# Patient Record
Sex: FEMALE | Race: WHITE | Employment: OTHER | URBAN - METROPOLITAN AREA
[De-identification: names, ages, dates, MRNs, and addresses within clinical notes are randomized per-mention and may not be internally consistent; named-entity substitution may affect disease eponyms.]

---

## 2022-07-15 ENCOUNTER — TELEPHONE (OUTPATIENT)
Dept: FAMILY MEDICINE CLINIC | Age: 87
End: 2022-07-15

## 2022-07-15 NOTE — TELEPHONE ENCOUNTER
----- Message from Gardenia Joyce sent at 7/15/2022  2:15 PM EDT -----  Subject: Appointment Request    Reason for Call: New Patient/New to Provider Appointment needed: New   Patient Request Appointment    QUESTIONS    Reason for appointment request? No appointments available during search     Additional Information for Provider? Pt daughter would like to establish   mother at this office. Pt daughter is a pt of the office. Is open to   seeing any provider in office.  Please contact as soon as possible.   ---------------------------------------------------------------------------  --------------  Karine Irby [facility]  [phone]; OK to leave message on voicemail  ---------------------------------------------------------------------------  --------------  SCRIPT ANSWERS  COVID Screen: Cinthia Romo

## 2022-08-10 ENCOUNTER — OFFICE VISIT (OUTPATIENT)
Dept: FAMILY MEDICINE CLINIC | Age: 87
End: 2022-08-10
Payer: MEDICARE

## 2022-08-10 VITALS
HEIGHT: 63 IN | DIASTOLIC BLOOD PRESSURE: 60 MMHG | TEMPERATURE: 97.6 F | HEART RATE: 62 BPM | SYSTOLIC BLOOD PRESSURE: 138 MMHG | OXYGEN SATURATION: 99 % | RESPIRATION RATE: 18 BRPM | BODY MASS INDEX: 30.51 KG/M2 | WEIGHT: 172.2 LBS

## 2022-08-10 DIAGNOSIS — B37.2 CANDIDAL INTERTRIGO: ICD-10-CM

## 2022-08-10 DIAGNOSIS — I48.0 PAROXYSMAL ATRIAL FIBRILLATION (HCC): ICD-10-CM

## 2022-08-10 DIAGNOSIS — E11.22 TYPE 2 DIABETES MELLITUS WITH STAGE 3B CHRONIC KIDNEY DISEASE, WITH LONG-TERM CURRENT USE OF INSULIN (HCC): ICD-10-CM

## 2022-08-10 DIAGNOSIS — Z79.4 TYPE 2 DIABETES MELLITUS WITH STAGE 3B CHRONIC KIDNEY DISEASE, WITH LONG-TERM CURRENT USE OF INSULIN (HCC): ICD-10-CM

## 2022-08-10 DIAGNOSIS — Z00.00 MEDICARE ANNUAL WELLNESS VISIT, SUBSEQUENT: Primary | ICD-10-CM

## 2022-08-10 DIAGNOSIS — E78.2 MIXED HYPERLIPIDEMIA: ICD-10-CM

## 2022-08-10 DIAGNOSIS — N18.32 TYPE 2 DIABETES MELLITUS WITH STAGE 3B CHRONIC KIDNEY DISEASE, WITH LONG-TERM CURRENT USE OF INSULIN (HCC): ICD-10-CM

## 2022-08-10 DIAGNOSIS — I10 PRIMARY HYPERTENSION: ICD-10-CM

## 2022-08-10 PROBLEM — N18.4 CKD (CHRONIC KIDNEY DISEASE), STAGE IV (HCC): Status: ACTIVE | Noted: 2019-09-06

## 2022-08-10 PROBLEM — I87.2 VENOUS STASIS DERMATITIS: Status: ACTIVE | Noted: 2017-08-15

## 2022-08-10 PROCEDURE — G0439 PPPS, SUBSEQ VISIT: HCPCS

## 2022-08-10 PROCEDURE — 99204 OFFICE O/P NEW MOD 45 MIN: CPT

## 2022-08-10 RX ORDER — NYSTATIN 100000 [USP'U]/G
POWDER TOPICAL 4 TIMES DAILY
Qty: 60 G | Refills: 5 | Status: SHIPPED | OUTPATIENT
Start: 2022-08-10

## 2022-08-10 RX ORDER — SIMVASTATIN 40 MG/1
20 TABLET, FILM COATED ORAL
COMMUNITY
End: 2022-08-10 | Stop reason: SDUPTHER

## 2022-08-10 RX ORDER — DEXTROMETHORPHAN HYDROBROMIDE, GUAIFENESIN 5; 100 MG/5ML; MG/5ML
650 LIQUID ORAL EVERY 8 HOURS
COMMUNITY

## 2022-08-10 RX ORDER — SENNOSIDES 8.6 MG/1
17.2 TABLET ORAL
COMMUNITY
Start: 2021-11-16 | End: 2022-08-10

## 2022-08-10 RX ORDER — FUROSEMIDE 40 MG/1
20 TABLET ORAL 2 TIMES DAILY
COMMUNITY
Start: 2021-11-16

## 2022-08-10 RX ORDER — POLYETHYLENE GLYCOL 3350 17 G/17G
17 POWDER, FOR SOLUTION ORAL
COMMUNITY
Start: 2021-11-16 | End: 2022-08-10

## 2022-08-10 RX ORDER — ASPIRIN 81 MG/1
81 TABLET ORAL DAILY
COMMUNITY

## 2022-08-10 RX ORDER — DILTIAZEM HYDROCHLORIDE 120 MG/1
1 CAPSULE, EXTENDED RELEASE ORAL DAILY
COMMUNITY
Start: 2021-11-17

## 2022-08-10 RX ORDER — SIMVASTATIN 20 MG/1
20 TABLET, FILM COATED ORAL DAILY
COMMUNITY

## 2022-08-10 NOTE — ACP (ADVANCE CARE PLANNING)
Discussed importance of advanced medical directives with patient. Patient is capable of making decisions. Discussed advanced directives 8/10/2022. [place] advance directive form discussed with pt. Pt will consider options and give us written form back for inclusion in chart.     Sam Rodriguez NP

## 2022-08-10 NOTE — PROGRESS NOTES
Chief Complaint   Patient presents with    [facility]     Here to [facility] and to talk about her IBS-D       HPI:    Alia Beatty is a 80 y.o. female who presents to [facility]; she is accompanied today by her daughter. She was living independently in [place] until about 9 months ago when she experienced several episodes of hypoglycemia; she has since moved to [place] to live with her daughter. Ms. Nilson Reyes is interactive and conversant but the conversation is somewhat tangential.    HTN:  Compliant with metoprolol and diltiazem; does not check BP at home. Denies CP, palpitations, SOB, BURT. PAF:  Details of this are unclear; was noted to be in SR during an ER visit in November. Remains compliant with diltiazem and metoprolol; does not follow with cardiology. Previously on warfarin but this was stopped to due to rectal bleeding and hx multiple falls. HLD:  Compliant with simvastatin. DM:  Diagnosed many years ago; compliant with Humulin 70/30 at once daily dosing. Daughter reports that dose was recently reduced due to hypoglycemia. She does not check sugars at home. Records from November 2021 show A1C 5.4. CKD:  Daughter and records indicate hx stage IV disease, but labs from November 2021 show creatinine 1.49 with GFR 33. IBS:  Ongoing issue for many years, mostly diarrhea-type with occasional constipation. Has improved over the past several months since moving in with her daughter; daughter avoids triggering foods in her diet including starches, certain fruits and vegetables. Has long history of hemorrhoidal bleeding but this has also improved. Struggles with red, inflamed rash under breasts and abdominal and groin folds. Uses OTC cream with tea tree oil daily and tries to keep area dry.     Allergies   Allergen Reactions    Penicillins Hives    Sulfa (Sulfonamide Antibiotics) Hives       Current Outpatient Medications   Medication Sig    dilTIAZem ER (TIAZAC) 120 mg capsule Take 1 Capsule by mouth in the morning. furosemide (LASIX) 40 mg tablet Take 20 mg by mouth two (2) times a day. insulin NPH/insulin regular (NOVOLIN 70/30, HUMULIN 70/30) 100 unit/mL (70-30) injection Take 12 Units by SubCUTAneous route in the morning. metoprolol succinate 50 mg CSpX Take 50 mg by mouth in the morning. simvastatin (ZOCOR) 20 mg tablet Take 20 mg by mouth in the morning. acetaminophen (Tylenol Arthritis Pain) 650 mg TbER Take 650 mg by mouth every eight (8) hours. aspirin delayed-release 81 mg tablet Take 81 mg by mouth in the morning. nystatin (MYCOSTATIN) powder Apply  to affected area four (4) times daily. No current facility-administered medications for this visit. History reviewed. No pertinent past medical history. History reviewed. No pertinent family history. Review of Systems   Constitutional: Negative. Negative for chills, fever and malaise/fatigue. HENT: Negative. Eyes: Negative. Respiratory: Negative. Negative for cough and shortness of breath. Cardiovascular: Negative. Negative for chest pain, palpitations and leg swelling. Gastrointestinal: Negative. Negative for abdominal pain, nausea and vomiting. Genitourinary: Negative. Musculoskeletal: Negative. Skin: Negative. Neurological: Negative. Negative for dizziness and headaches. Endo/Heme/Allergies: Negative. Psychiatric/Behavioral: Negative. Negative for depression. The patient is not nervous/anxious.        /60 (BP 1 Location: Right upper arm, BP Patient Position: Sitting, BP Cuff Size: Adult long)   Pulse 62   Temp 97.6 °F (36.4 °C) (Temporal)   Resp 18   Ht 5' 3\" (1.6 m)   Wt 172 lb 3.2 oz (78.1 kg)   SpO2 99%   BMI 30.50 kg/m²     Wt Readings from Last 3 Encounters:   08/10/22 172 lb 3.2 oz (78.1 kg)       3 most recent PHQ Screens 8/10/2022   Little interest or pleasure in doing things Not at all   Feeling down, depressed, irritable, or hopeless Not at all   Total Score PHQ 2 0   Trouble falling or staying asleep, or sleeping too much Not at all   Feeling tired or having little energy Not at all   Poor appetite, weight loss, or overeating Not at all   Feeling bad about yourself - or that you are a failure or have let yourself or your family down Not at all   Trouble concentrating on things such as school, work, reading, or watching TV Not at all   Moving or speaking so slowly that other people could have noticed; or the opposite being so fidgety that others notice Not at all   Thoughts of being better off dead, or hurting yourself in some way Not at all   PHQ 9 Score 0         Physical Exam  Vitals and nursing note reviewed. Constitutional:       General: She is not in acute distress. Appearance: Normal appearance. She is obese. HENT:      Head: Normocephalic and atraumatic. Mouth/Throat:      Mouth: Mucous membranes are moist.      Pharynx: Oropharynx is clear. Eyes:      Extraocular Movements: Extraocular movements intact. Conjunctiva/sclera: Conjunctivae normal.      Pupils: Pupils are equal, round, and reactive to light. Neck:      Vascular: No carotid bruit. Cardiovascular:      Rate and Rhythm: Normal rate and regular rhythm. Pulses: Normal pulses. Heart sounds: Normal heart sounds. No murmur heard. No friction rub. No gallop. Pulmonary:      Effort: Pulmonary effort is normal.      Breath sounds: Normal breath sounds. No wheezing, rhonchi or rales. Abdominal:      General: Bowel sounds are normal.      Palpations: Abdomen is soft. Musculoskeletal:         General: Normal range of motion. Cervical back: Normal range of motion and neck supple. Comments: Ambulatory with walker   Lymphadenopathy:      Cervical: No cervical adenopathy. Skin:     General: Skin is warm and dry.       Comments: Erythematous moist rash under both breast, at umbilicus, and in abdominal folds  Dark, shiny rash on bilateral anterior lower legs c/w venous stasis dermatitis   Neurological:      General: No focal deficit present. Mental Status: She is alert and oriented to person, place, and time. Mental status is at baseline. Psychiatric:         Attention and Perception: Attention and perception normal.         Mood and Affect: Mood and affect normal.         Speech: Speech is tangential.         Behavior: Behavior normal. Behavior is cooperative. Thought Content: Thought content normal.         Cognition and Memory: Cognition is impaired. Memory is impaired. Judgment: Judgment normal.       ASSESSMENT AND PLAN:       ICD-10-CM ICD-9-CM    1. Medicare annual wellness visit, subsequent  Z00.00 V70.0       2. Type 2 diabetes mellitus with stage 3b chronic kidney disease, with long-term current use of insulin (HCC)  E11.22 250.40     N18.32 585.3     Z79.4 V58.67       3. Mixed hyperlipidemia  E78.2 272.2 CANCELED: COLLECTION VENOUS BLOOD,VENIPUNCTURE      CANCELED: LIPID PANEL      4. Primary hypertension  I10 401.9 CANCELED: COLLECTION VENOUS BLOOD,VENIPUNCTURE      CANCELED: CBC WITH AUTOMATED DIFF      CANCELED: METABOLIC PANEL, COMPREHENSIVE      CANCELED: TSH 3RD GENERATION      5. Paroxysmal atrial fibrillation (HCC)  I48.0 427.31       6. Candidal intertrigo  B37.2 112.3 nystatin (MYCOSTATIN) powder          Available records reviewed, will obtain most recent visit from PCP on month ago. Completed forms for DMV handicapped tag; patient does not drive, but would like to have available for daughter's car. Discussed care of candidal intertrigo; keep area as dry as possible, use Nystatin powder. Recommend daughter contact [address] regarding local senior activities in which her mother can participate and for assistance with respite care.     Medication Side Effects and Warnings were discussed with patient:  yes  Patient Labs were reviewed:  yes  Patient Past Records were reviewed:  yes      Patient aware of plan of care and verbalized understanding. Questions answered. RTC 3 months or sooner if needed. On this date 08/10/2022 I have spent 45 minutes reviewing previous notes, test results and face to face with the patient discussing the diagnosis and importance of compliance with the treatment plan as well as documenting on the day of the visit.     Chandrakant Palumbo, NP

## 2022-08-10 NOTE — PROGRESS NOTES
Rasheeda Nathan is a 80 y.o. female presenting for/with:    Chief Complaint   Patient presents with    Establish Care     Here to [facility] care and to talk about her IBS-D       Visit Vitals  /60 (BP 1 Location: Right upper arm, BP Patient Position: Sitting, BP Cuff Size: Adult long)   Pulse 62   Temp 97.6 °F (36.4 °C) (Temporal)   Resp 18   Ht 5' 3\" (1.6 m)   Wt 172 lb 3.2 oz (78.1 kg)   SpO2 99%   BMI 30.50 kg/m²     Pain Scale: /10  Pain Location:     1. \"Have you been to the ER, urgent care clinic since your last visit? Hospitalized since your last visit? \" No    2. \"Have you seen or consulted any other health care providers outside of the [address] [place] [address] since your last visit? \" No     3. For patients aged 39-70: Has the patient had a colonoscopy / FIT/ Cologuard? NA - based on age      If the patient is female:    4. For patients aged 41-77: Has the patient had a mammogram within the past 2 years? NA - based on age or sex      11. For patients aged 21-65: Has the patient had a pap smear?  NA - based on age or sex      Symptom review:  NO  Fever   NO  Shaking chills  NO  Cough  NO  Body aches  NO  Coughing up blood  NO  Chest congestion  NO  Chest pain  NO  Shortness of breath  NO  Profound Loss of smell/taste  NO  Nausea/Vomiting   NO  Loose stool/Diarrhea  NO  any skin issues    Patient Risk Factors Reviewed as follows:  NO  have you been in Close contact with confirmed COVID19 patient   NO  History of recent travel to affected geographical areas within the past 14 days  NO  COPD  NO  Active Cancer/Leukemia/Lymphoma/Chemotherapy  NO  Oral steroid use  NO  Pregnant  YES  Diabetes Mellitus  YES  Heart disease  NO  Asthma  NO Health care worker at home  NO Health care worker  NO Is there a Pregnant Woman in the home  NO Dialysis pt in the home   NO a large number of people living in the home    Learning Assessment 8/10/2022   PRIMARY LEARNER Patient   PRIMARY LANGUAGE ENGLISH   LEARNER PREFERENCE PRIMARY DEMONSTRATION   ANSWERED BY self   RELATIONSHIP SELF     No flowsheet data found. 3 most recent PHQ Screens 8/10/2022   Little interest or pleasure in doing things Not at all   Feeling down, depressed, irritable, or hopeless Not at all   Total Score PHQ 2 0   Trouble falling or staying asleep, or sleeping too much Not at all   Feeling tired or having little energy Not at all   Poor appetite, weight loss, or overeating Not at all   Feeling bad about yourself - or that you are a failure or have let yourself or your family down Not at all   Trouble concentrating on things such as school, work, reading, or watching TV Not at all   Moving or speaking so slowly that other people could have noticed; or the opposite being so fidgety that others notice Not at all   Thoughts of being better off dead, or hurting yourself in some way Not at all   PHQ 9 Score 0     No flowsheet data found. No flowsheet data found. No flowsheet data found. This is the Subsequent Medicare Annual Wellness Exam, performed 12 months or more after the Initial AWV or the last Subsequent AWV    I have reviewed the patient's medical history in detail and updated the computerized patient record. Assessment/Plan   Education and counseling provided:  Are appropriate based on today's review and evaluation    1. Type 2 diabetes mellitus without complication, with long-term current use of insulin (HCC)  -     COLLECTION VENOUS BLOOD,VENIPUNCTURE  -     HEMOGLOBIN A1C WITH EAG; Future  2. Mixed hyperlipidemia  -     COLLECTION VENOUS BLOOD,VENIPUNCTURE  -     LIPID PANEL; Future  3. Primary hypertension  -     COLLECTION VENOUS BLOOD,VENIPUNCTURE  -     CBC WITH AUTOMATED DIFF; Future  -     METABOLIC PANEL, COMPREHENSIVE; Future  -     TSH 3RD GENERATION; Future  4. Pulmonary HTN (HCC)  -     COLLECTION VENOUS BLOOD,VENIPUNCTURE  -     CBC WITH AUTOMATED DIFF; Future  -     METABOLIC PANEL, COMPREHENSIVE; Future  5.  Medicare annual wellness visit, subsequent       Depression Risk Factor Screening     3 most recent PHQ Screens 8/10/2022   Little interest or pleasure in doing things Not at all   Feeling down, depressed, irritable, or hopeless Not at all   Total Score PHQ 2 0   Trouble falling or staying asleep, or sleeping too much Not at all   Feeling tired or having little energy Not at all   Poor appetite, weight loss, or overeating Not at all   Feeling bad about yourself - or that you are a failure or have let yourself or your family down Not at all   Trouble concentrating on things such as school, work, reading, or watching TV Not at all   Moving or speaking so slowly that other people could have noticed; or the opposite being so fidgety that others notice Not at all   Thoughts of being better off dead, or hurting yourself in some way Not at all   PHQ 9 Score 0       Alcohol & Drug Abuse Risk Screen    Do you average more than 1 drink per night or more than 7 drinks a week:  No    On any one occasion in the past three months have you have had more than 3 drinks containing alcohol:  No          Functional Ability and Level of Safety    Hearing: Hearing is good. Activities of Daily Living: The home contains: no safety equipment. Patient does total self care      Ambulation: with no difficulty     Fall Risk:  No flowsheet data found.    Abuse Screen:  Patient is not abused       Cognitive Screening    Has your family/caregiver stated any concerns about your memory: no     Cognitive Screening: Normal - Verbal Fluency Test    Health Maintenance Due     Health Maintenance Due   Topic Date Due    Lipid Screen  Never done    DTaP/Tdap/Td series (1 - Tdap) Never done    Shingrix Vaccine Age 50> (1 of 2) Never done    Bone Densitometry (Dexa) Screening  Never done    Pneumococcal 65+ years (2 - PPSV23 or PCV20) 07/27/2016    COVID-19 Vaccine (3 - Booster for Sinclair Peter series) 10/13/2021       Patient Care Team   Patient Care Team:  Aquilino López NP as PCP - General (Nurse Practitioner)    History   There is no problem list on file for this patient. History reviewed. No pertinent past medical history. History reviewed. No pertinent surgical history. Current Outpatient Medications   Medication Sig Dispense Refill    dilTIAZem ER (TIAZAC) 120 mg capsule Take 1 Capsule by mouth in the morning. furosemide (LASIX) 40 mg tablet Take 40 mg by mouth. insulin NPH/insulin regular (NOVOLIN 70/30, HUMULIN 70/30) 100 unit/mL (70-30) injection 8 Units by SubCUTAneous route. polyethylene glycol (MIRALAX) 17 gram/dose powder Take 17 g by mouth. senna (SENOKOT) 8.6 mg tablet Take 17.2 mg by mouth.      metoprolol succinate 50 mg CSpX       simvastatin (ZOCOR) 20 mg tablet        Allergies   Allergen Reactions    Penicillins Hives    Sulfa (Sulfonamide Antibiotics) Hives       History reviewed. No pertinent family history.   Social History     Tobacco Use    Smoking status: Never    Smokeless tobacco: Not on file   Substance Use Topics    Alcohol use: Never         Erroll Marcos

## 2022-11-10 ENCOUNTER — OFFICE VISIT (OUTPATIENT)
Dept: FAMILY MEDICINE CLINIC | Age: 87
End: 2022-11-10
Payer: MEDICARE

## 2022-11-10 VITALS
DIASTOLIC BLOOD PRESSURE: 63 MMHG | SYSTOLIC BLOOD PRESSURE: 118 MMHG | RESPIRATION RATE: 18 BRPM | HEIGHT: 63 IN | OXYGEN SATURATION: 99 % | WEIGHT: 174.4 LBS | HEART RATE: 63 BPM | BODY MASS INDEX: 30.9 KG/M2

## 2022-11-10 DIAGNOSIS — J30.2 SEASONAL ALLERGIC RHINITIS, UNSPECIFIED TRIGGER: ICD-10-CM

## 2022-11-10 DIAGNOSIS — Z23 ENCOUNTER FOR IMMUNIZATION: ICD-10-CM

## 2022-11-10 DIAGNOSIS — I87.2 VENOUS STASIS DERMATITIS OF BOTH LOWER EXTREMITIES: ICD-10-CM

## 2022-11-10 DIAGNOSIS — I10 PRIMARY HYPERTENSION: Primary | ICD-10-CM

## 2022-11-10 DIAGNOSIS — Z79.4 TYPE 2 DIABETES MELLITUS WITH STAGE 3B CHRONIC KIDNEY DISEASE, WITH LONG-TERM CURRENT USE OF INSULIN (HCC): ICD-10-CM

## 2022-11-10 DIAGNOSIS — N18.32 TYPE 2 DIABETES MELLITUS WITH STAGE 3B CHRONIC KIDNEY DISEASE, WITH LONG-TERM CURRENT USE OF INSULIN (HCC): ICD-10-CM

## 2022-11-10 DIAGNOSIS — B37.2 CANDIDAL INTERTRIGO: ICD-10-CM

## 2022-11-10 DIAGNOSIS — E78.2 MIXED HYPERLIPIDEMIA: ICD-10-CM

## 2022-11-10 DIAGNOSIS — E11.22 TYPE 2 DIABETES MELLITUS WITH STAGE 3B CHRONIC KIDNEY DISEASE, WITH LONG-TERM CURRENT USE OF INSULIN (HCC): ICD-10-CM

## 2022-11-10 DIAGNOSIS — I48.0 PAROXYSMAL ATRIAL FIBRILLATION (HCC): ICD-10-CM

## 2022-11-10 PROBLEM — N18.4 CKD (CHRONIC KIDNEY DISEASE), STAGE IV (HCC): Status: RESOLVED | Noted: 2019-09-06 | Resolved: 2022-11-10

## 2022-11-10 LAB — HBA1C MFR BLD HPLC: 6.8 %

## 2022-11-10 PROCEDURE — 1123F ACP DISCUSS/DSCN MKR DOCD: CPT

## 2022-11-10 PROCEDURE — 99214 OFFICE O/P EST MOD 30 MIN: CPT

## 2022-11-10 PROCEDURE — 90694 VACC AIIV4 NO PRSRV 0.5ML IM: CPT

## 2022-11-10 PROCEDURE — G0008 ADMIN INFLUENZA VIRUS VAC: HCPCS

## 2022-11-10 PROCEDURE — 90677 PCV20 VACCINE IM: CPT

## 2022-11-10 PROCEDURE — 3044F HG A1C LEVEL LT 7.0%: CPT

## 2022-11-10 PROCEDURE — 36415 COLL VENOUS BLD VENIPUNCTURE: CPT

## 2022-11-10 PROCEDURE — G0009 ADMIN PNEUMOCOCCAL VACCINE: HCPCS

## 2022-11-10 PROCEDURE — 83036 HEMOGLOBIN GLYCOSYLATED A1C: CPT

## 2022-11-10 RX ORDER — CETIRIZINE HYDROCHLORIDE 10 MG/1
10 TABLET ORAL DAILY
Qty: 90 TABLET | Refills: 0 | Status: SHIPPED | OUTPATIENT
Start: 2022-11-10

## 2022-11-10 NOTE — PROGRESS NOTES
Chief Complaint   Patient presents with    Follow-up    Diabetes       HPI:    Brooke Velazquez is a 80 y.o. female who presents for chronic follow-up. HTN:  Compliant with metoprolol and diltiazem; does not check BP at home. Denies CP, palpitations, SOB, BURT. PAF:  Details of this are unclear; was noted to be in SR during an ER visit in November 2021. Remains compliant with diltiazem and metoprolol; does not follow with cardiology. Previously on warfarin but this was stopped to due to rectal bleeding and hx multiple falls. HLD:  Compliant with simvastatin. DM:  Diagnosed many years ago; compliant with Humulin 70/30 at once daily dosing. She does not check sugars at home. No recent episodes of hypoglycemia. CKD:  Daughter and records indicate hx stage IV disease, but labs from November 2021 show creatinine 1.49 with GFR 33. IBS:  Ongoing issue for many years, mostly diarrhea-type with occasional constipation. Has improved over the past several months since moving in with her daughter; daughter avoids triggering foods in her diet including starches, certain fruits and vegetables. Has long history of hemorrhoidal bleeding but this has also improved. Dermatitis:  Red, inflamed rash under breasts and abdominal and groin folds. Uses OTC cream with tea tree oil daily and tries to keep area dry. New issues:  She reports occasional runny nose and watery eyes related to seasonal allergies; she has been using Claritin which has not been very helpful. Allergies   Allergen Reactions    Penicillins Hives    Sulfa (Sulfonamide Antibiotics) Hives       Current Outpatient Medications   Medication Sig    cetirizine (ZYRTEC) 10 mg tablet Take 1 Tablet by mouth daily. dilTIAZem ER (TIAZAC) 120 mg capsule Take 1 Capsule by mouth in the morning. furosemide (LASIX) 40 mg tablet Take 20 mg by mouth two (2) times a day.     insulin NPH/insulin regular (NOVOLIN 70/30, HUMULIN 70/30) 100 unit/mL (70-30) injection Take 12 Units by SubCUTAneous route in the morning. metoprolol succinate 50 mg CSpX Take 50 mg by mouth in the morning. simvastatin (ZOCOR) 20 mg tablet Take 20 mg by mouth in the morning. acetaminophen (TYLENOL) 650 mg TbER Take 650 mg by mouth every eight (8) hours. aspirin delayed-release 81 mg tablet Take 81 mg by mouth in the morning. nystatin (MYCOSTATIN) powder Apply  to affected area four (4) times daily. No current facility-administered medications for this visit. History reviewed. No pertinent past medical history. History reviewed. No pertinent family history. Review of Systems   Constitutional: Negative. Negative for chills, fever and malaise/fatigue. HENT: Negative. Eyes: Negative. Respiratory: Negative. Negative for cough and shortness of breath. Cardiovascular: Negative. Negative for chest pain, palpitations and leg swelling. Gastrointestinal:  Positive for constipation and diarrhea. Negative for abdominal pain, nausea and vomiting. Genitourinary: Negative. Musculoskeletal: Negative. Skin:  Positive for rash. Neurological: Negative. Negative for dizziness and headaches. Endo/Heme/Allergies: Negative. Psychiatric/Behavioral: Negative. Negative for depression. The patient is not nervous/anxious.        /63 (BP 1 Location: Left upper arm, BP Patient Position: Sitting, BP Cuff Size: Adult long)   Pulse 63   Resp 18   Ht 5' 3\" (1.6 m)   Wt 174 lb 6.4 oz (79.1 kg)   SpO2 99%   BMI 30.89 kg/m²     Wt Readings from Last 3 Encounters:   11/10/22 174 lb 6.4 oz (79.1 kg)   08/10/22 172 lb 3.2 oz (78.1 kg)       3 most recent PHQ Screens 11/10/2022   Little interest or pleasure in doing things Not at all   Feeling down, depressed, irritable, or hopeless Not at all   Total Score PHQ 2 0   Trouble falling or staying asleep, or sleeping too much -   Feeling tired or having little energy -   Poor appetite, weight loss, or overeating -   Feeling bad about yourself - or that you are a failure or have let yourself or your family down -   Trouble concentrating on things such as school, work, reading, or watching TV -   Moving or speaking so slowly that other people could have noticed; or the opposite being so fidgety that others notice -   Thoughts of being better off dead, or hurting yourself in some way -   PHQ 9 Score -         Physical Exam  Vitals and nursing note reviewed. Constitutional:       General: She is not in acute distress. Appearance: Normal appearance. She is overweight. HENT:      Head: Normocephalic and atraumatic. Mouth/Throat:      Mouth: Mucous membranes are moist.      Pharynx: Oropharynx is clear. Eyes:      Extraocular Movements: Extraocular movements intact. Conjunctiva/sclera: Conjunctivae normal.      Pupils: Pupils are equal, round, and reactive to light. Neck:      Vascular: No carotid bruit. Cardiovascular:      Rate and Rhythm: Normal rate and regular rhythm. Pulses: Normal pulses. Heart sounds: Normal heart sounds. No murmur heard. No friction rub. No gallop. Pulmonary:      Effort: Pulmonary effort is normal.      Breath sounds: Normal breath sounds. No wheezing, rhonchi or rales. Abdominal:      General: Bowel sounds are normal.      Palpations: Abdomen is soft. Musculoskeletal:         General: Normal range of motion. Cervical back: Normal range of motion and neck supple. Feet:      Right foot:      Protective Sensation: 6 sites tested. 6 sites sensed. Skin integrity: Dry skin present. Toenail Condition: Right toenails are abnormally thick. Fungal disease present. Left foot:      Protective Sensation: 6 sites tested. 6 sites sensed. Skin integrity: Dry skin present. Toenail Condition: Left toenails are abnormally thick. Fungal disease present. Lymphadenopathy:      Cervical: No cervical adenopathy.    Skin:     General: Skin is warm and dry. Comments: Shiny, erythematous rash in abdominal and groin folds   Thick, woody dermatitis to pretibial aspect of BLE   Neurological:      General: No focal deficit present. Mental Status: She is alert and oriented to person, place, and time. Mental status is at baseline. Psychiatric:         Mood and Affect: Mood normal.         Behavior: Behavior normal.         Thought Content: Thought content normal.         Judgment: Judgment normal.       ASSESSMENT AND PLAN:       ICD-10-CM ICD-9-CM    1. Primary hypertension  I10 401.9 COLLECTION VENOUS BLOOD,VENIPUNCTURE      CBC WITH AUTOMATED DIFF      METABOLIC PANEL, COMPREHENSIVE      TSH 3RD GENERATION      TSH 3RD GENERATION      METABOLIC PANEL, COMPREHENSIVE      CBC WITH AUTOMATED DIFF      2. Type 2 diabetes mellitus with stage 3b chronic kidney disease, with long-term current use of insulin (HCC)  E11.22 250.40 MICROALBUMIN, UR, RAND W/ MICROALB/CREAT RATIO    N18.32 585.3 COLLECTION VENOUS BLOOD,VENIPUNCTURE    Z79.4 V58.67 AMB POC HEMOGLOBIN A1C      MICROALBUMIN, UR, RAND W/ MICROALB/CREAT RATIO      HM DIABETES FOOT EXAM      3. Mixed hyperlipidemia  E78.2 272.2 COLLECTION VENOUS BLOOD,VENIPUNCTURE      LIPID PANEL      LIPID PANEL      4. Paroxysmal atrial fibrillation (HCC)  I48.0 427.31       5. Venous stasis dermatitis of both lower extremities  I87.2 454.1       6. Candidal intertrigo  B37.2 112.3       7. Seasonal allergic rhinitis, unspecified trigger  J30.2 477.9 cetirizine (ZYRTEC) 10 mg tablet      8. Encounter for immunization  Z23 V03.89 [place] IMMUNIZ ADMIN,1 SINGLE/COMB VAC/TOXOID      INFLUENZA, FLUAD, (AGE 65 Y+), IM, PF, 0.5 ML      PNEUMOCOCCAL, PCV20, PREVNAR 20, (AGE 18 YRS+), IM, PF          A1C 6.8; continue current regimen. Normotensive today; continue current regimen. Switch daily antihistamine from loratadine to cetirizine.   Health Maintenance reviewed - urine microalbumin ordered, Pneumovax and flu vaccines given, patient asked to schedule diabetic eye exam.  Continue OTC tea tree topical treatment of candidiasis; keep area as dry as possible. Continue to use Imodium PRN for IBS-related diarrhea; recommend adding fiber supplement to her diet. Medication Side Effects and Warnings were discussed with patient:  yes  Patient Labs were reviewed:  yes  Patient Past Records were reviewed:  yes      Patient aware of plan of care and verbalized understanding. Questions answered. RTC 3 months or sooner if needed. On this date 11/10/2022 I have spent 30 minutes reviewing previous notes, test results and face to face with the patient discussing the diagnosis and importance of compliance with the treatment plan as well as documenting on the day of the visit.     Sundar Jorge NP

## 2022-11-10 NOTE — PATIENT INSTRUCTIONS
[facility] [phone]  [facility] [phone]  [place] [id number] [phone]    Try Benefiber or Metamucil

## 2022-11-10 NOTE — PROGRESS NOTES
flu administered in Right deltoid. Patient tolerated medication well. AVS provided to patient with medication information. Patient states understanding. pcv20 administered in left deltoid. Patient tolerated medication well. AVS provided to patient with medication information. Patient states understanding. 1. \"Have you been to the ER, urgent care clinic since your last visit? Hospitalized since your last visit? \" No    2. \"Have you seen or consulted any other health care providers outside of the [address] [place] [address] since your last visit? \" No     3. For patients aged 39-70: Has the patient had a colonoscopy / FIT/ Cologuard? NA - based on age      If the patient is female:    4. For patients aged 41-77: Has the patient had a mammogram within the past 2 years? NA - based on age or sex      11. For patients aged 21-65: Has the patient had a pap smear? NA - based on age or sex    Chief Complaint   Patient presents with    Follow-up    Diabetes     Visit Vitals  /63 (BP 1 Location: Left upper arm, BP Patient Position: Sitting, BP Cuff Size: Adult long)   Pulse 63   Resp 18   Ht 5' 3\" (1.6 m)   Wt 174 lb 6.4 oz (79.1 kg)   SpO2 99%   BMI 30.89 kg/m²     Results for orders placed or performed in visit on 11/10/22   1.  AMB POC HEMOGLOBIN A1C   Result Value Ref Range    Hemoglobin A1c (POC) 6.8 %

## 2022-11-11 LAB
ALBUMIN SERPL-MCNC: 3.7 G/DL (ref 3.5–5)
ALBUMIN/GLOB SERPL: 1.1 {RATIO} (ref 1.1–2.2)
ALP SERPL-CCNC: 138 U/L (ref 45–117)
ALT SERPL-CCNC: 13 U/L (ref 12–78)
ANION GAP SERPL CALC-SCNC: 6 MMOL/L (ref 5–15)
AST SERPL-CCNC: 11 U/L (ref 15–37)
BASOPHILS # BLD: 0.1 K/UL (ref 0–0.1)
BASOPHILS NFR BLD: 1 % (ref 0–1)
BILIRUB SERPL-MCNC: 0.5 MG/DL (ref 0.2–1)
BUN SERPL-MCNC: 47 MG/DL (ref 6–20)
BUN/CREAT SERPL: 24 (ref 12–20)
CALCIUM SERPL-MCNC: 8.8 MG/DL (ref 8.5–10.1)
CHLORIDE SERPL-SCNC: 107 MMOL/L (ref 97–108)
CHOLEST SERPL-MCNC: 92 MG/DL
CO2 SERPL-SCNC: 28 MMOL/L (ref 21–32)
CREAT SERPL-MCNC: 1.93 MG/DL (ref 0.55–1.02)
CREAT UR-MCNC: 32.9 MG/DL
DIFFERENTIAL METHOD BLD: ABNORMAL
EOSINOPHIL # BLD: 0.1 K/UL (ref 0–0.4)
EOSINOPHIL NFR BLD: 2 % (ref 0–7)
ERYTHROCYTE [DISTWIDTH] IN BLOOD BY AUTOMATED COUNT: 13.5 % (ref 11.5–14.5)
GLOBULIN SER CALC-MCNC: 3.4 G/DL (ref 2–4)
GLUCOSE SERPL-MCNC: 174 MG/DL (ref 65–100)
HCT VFR BLD AUTO: 42.5 % (ref 35–47)
HDLC SERPL-MCNC: 52 MG/DL
HDLC SERPL: 1.8 {RATIO} (ref 0–5)
HGB BLD-MCNC: 12.7 G/DL (ref 11.5–16)
IMM GRANULOCYTES # BLD AUTO: 0 K/UL (ref 0–0.04)
IMM GRANULOCYTES NFR BLD AUTO: 0 % (ref 0–0.5)
LDLC SERPL CALC-MCNC: 24.2 MG/DL (ref 0–100)
LYMPHOCYTES # BLD: 1.1 K/UL (ref 0.8–3.5)
LYMPHOCYTES NFR BLD: 18 % (ref 12–49)
MCH RBC QN AUTO: 29 PG (ref 26–34)
MCHC RBC AUTO-ENTMCNC: 29.9 G/DL (ref 30–36.5)
MCV RBC AUTO: 97 FL (ref 80–99)
MICROALBUMIN UR-MCNC: 6.07 MG/DL
MICROALBUMIN/CREAT UR-RTO: 184 MG/G (ref 0–30)
MONOCYTES # BLD: 0.5 K/UL (ref 0–1)
MONOCYTES NFR BLD: 9 % (ref 5–13)
NEUTS SEG # BLD: 4.2 K/UL (ref 1.8–8)
NEUTS SEG NFR BLD: 70 % (ref 32–75)
NRBC # BLD: 0 K/UL (ref 0–0.01)
NRBC BLD-RTO: 0 PER 100 WBC
PLATELET # BLD AUTO: 167 K/UL (ref 150–400)
POTASSIUM SERPL-SCNC: 5.1 MMOL/L (ref 3.5–5.1)
PROT SERPL-MCNC: 7.1 G/DL (ref 6.4–8.2)
RBC # BLD AUTO: 4.38 M/UL (ref 3.8–5.2)
SODIUM SERPL-SCNC: 141 MMOL/L (ref 136–145)
TRIGL SERPL-MCNC: 79 MG/DL (ref ?–150)
TSH SERPL DL<=0.05 MIU/L-ACNC: 3.47 UIU/ML (ref 0.36–3.74)
VLDLC SERPL CALC-MCNC: 15.8 MG/DL
WBC # BLD AUTO: 6 K/UL (ref 3.6–11)

## 2022-12-07 ENCOUNTER — PATIENT MESSAGE (OUTPATIENT)
Dept: FAMILY MEDICINE CLINIC | Age: 87
End: 2022-12-07

## 2022-12-08 RX ORDER — DILTIAZEM HYDROCHLORIDE 120 MG/1
120 CAPSULE, EXTENDED RELEASE ORAL DAILY
Qty: 90 CAPSULE | Refills: 1 | Status: SHIPPED | OUTPATIENT
Start: 2022-12-08

## 2022-12-08 NOTE — TELEPHONE ENCOUNTER
From: Abby Dumont  To: Hector Grimaldo NP  Sent: 12/7/2022 4:17 PM EST  Subject: diltiazem hydrochloride extended release capsules 128mg    ruth valadez is out of refills for some of her meds. the diltiazem she only has 12 days left on this meds. the pharmacy has sent notice of meds that are out of refills. thanks,  paolo diane- daughter and caregiver for ruth.

## 2023-01-02 ENCOUNTER — PATIENT MESSAGE (OUTPATIENT)
Dept: FAMILY MEDICINE CLINIC | Age: 88
End: 2023-01-02

## 2023-01-04 ENCOUNTER — TELEPHONE (OUTPATIENT)
Dept: FAMILY MEDICINE CLINIC | Age: 88
End: 2023-01-04

## 2023-01-04 DIAGNOSIS — I10 PRIMARY HYPERTENSION: ICD-10-CM

## 2023-01-04 DIAGNOSIS — I48.0 PAROXYSMAL ATRIAL FIBRILLATION (HCC): Primary | ICD-10-CM

## 2023-01-04 RX ORDER — METOPROLOL SUCCINATE 50 MG/1
50 TABLET, EXTENDED RELEASE ORAL DAILY
Qty: 90 TABLET | Refills: 1 | Status: SHIPPED | OUTPATIENT
Start: 2023-01-04

## 2023-02-05 DIAGNOSIS — J30.2 SEASONAL ALLERGIC RHINITIS, UNSPECIFIED TRIGGER: ICD-10-CM

## 2023-02-06 RX ORDER — CETIRIZINE HYDROCHLORIDE 10 MG/1
10 TABLET ORAL DAILY
Qty: 90 TABLET | Refills: 0 | Status: SHIPPED | OUTPATIENT
Start: 2023-02-06

## 2023-02-28 ENCOUNTER — OFFICE VISIT (OUTPATIENT)
Dept: FAMILY MEDICINE CLINIC | Age: 88
End: 2023-02-28
Payer: MEDICARE

## 2023-02-28 VITALS
RESPIRATION RATE: 18 BRPM | SYSTOLIC BLOOD PRESSURE: 138 MMHG | HEIGHT: 63 IN | WEIGHT: 173 LBS | OXYGEN SATURATION: 100 % | DIASTOLIC BLOOD PRESSURE: 60 MMHG | BODY MASS INDEX: 30.65 KG/M2 | HEART RATE: 65 BPM

## 2023-02-28 DIAGNOSIS — I10 PRIMARY HYPERTENSION: ICD-10-CM

## 2023-02-28 DIAGNOSIS — I48.0 PAROXYSMAL ATRIAL FIBRILLATION (HCC): ICD-10-CM

## 2023-02-28 DIAGNOSIS — N18.32 TYPE 2 DIABETES MELLITUS WITH STAGE 3B CHRONIC KIDNEY DISEASE, WITH LONG-TERM CURRENT USE OF INSULIN (HCC): Primary | ICD-10-CM

## 2023-02-28 DIAGNOSIS — E11.22 TYPE 2 DIABETES MELLITUS WITH STAGE 3B CHRONIC KIDNEY DISEASE, WITH LONG-TERM CURRENT USE OF INSULIN (HCC): Primary | ICD-10-CM

## 2023-02-28 DIAGNOSIS — B37.2 CANDIDAL INTERTRIGO: ICD-10-CM

## 2023-02-28 DIAGNOSIS — Z71.89 ADVANCED CARE PLANNING/COUNSELING DISCUSSION: ICD-10-CM

## 2023-02-28 DIAGNOSIS — I87.2 VENOUS STASIS DERMATITIS OF BOTH LOWER EXTREMITIES: ICD-10-CM

## 2023-02-28 DIAGNOSIS — Z79.4 TYPE 2 DIABETES MELLITUS WITH STAGE 3B CHRONIC KIDNEY DISEASE, WITH LONG-TERM CURRENT USE OF INSULIN (HCC): Primary | ICD-10-CM

## 2023-02-28 DIAGNOSIS — E78.2 MIXED HYPERLIPIDEMIA: ICD-10-CM

## 2023-02-28 LAB — HBA1C MFR BLD HPLC: 6.8 %

## 2023-02-28 PROCEDURE — G8536 NO DOC ELDER MAL SCRN: HCPCS

## 2023-02-28 PROCEDURE — 83036 HEMOGLOBIN GLYCOSYLATED A1C: CPT

## 2023-02-28 PROCEDURE — G8427 DOCREV CUR MEDS BY ELIG CLIN: HCPCS

## 2023-02-28 PROCEDURE — 1090F PRES/ABSN URINE INCON ASSESS: CPT

## 2023-02-28 PROCEDURE — G8432 DEP SCR NOT DOC, RNG: HCPCS

## 2023-02-28 PROCEDURE — 1101F PT FALLS ASSESS-DOCD LE1/YR: CPT

## 2023-02-28 PROCEDURE — G8417 CALC BMI ABV UP PARAM F/U: HCPCS

## 2023-02-28 PROCEDURE — 1123F ACP DISCUSS/DSCN MKR DOCD: CPT

## 2023-02-28 PROCEDURE — 99214 OFFICE O/P EST MOD 30 MIN: CPT

## 2023-02-28 PROCEDURE — 3044F HG A1C LEVEL LT 7.0%: CPT

## 2023-02-28 NOTE — PROGRESS NOTES
YES Answers must have Comments  1. \"Have you been to the ER, urgent care clinic since your last visit? Hospitalized since your last visit? \"    [] YES   [x] NO       2. Have you seen or consulted any other health care providers outside of [address] [place] [address] since your last visit?     [] YES   [x] NO       3. For patients aged 39-70: Have you had a colorectal cancer screening such as a colonoscopy/FIT/Cologuard? Nurse/CMA to request records if not in chart   [] YES   [] NO   [x] NA, based on age    If the patient is female:      4. For female patients aged 41-77: Breanna Sessions you had a mammogram in the last two years?  Nurse/CMA to request records if not in chart   [] YES   [] NO   [x] NA, based on age    11. For female patients aged 21-65: Breanna Sessions you had a pap smear?   Nurse/CMA to request records if not in chart   [] YES   [] NO  [x] NA, based on age    Chief Complaint   Patient presents with    Follow-up     Visit Vitals  /60 (BP 1 Location: Left upper arm, BP Patient Position: Sitting)   Pulse 65   Resp 18   Ht 5' 3\" (1.6 m)   Wt 173 lb (78.5 kg)   SpO2 100%   BMI 30.65 kg/m²       Results for orders placed or performed in visit on 02/28/23   AMB POC HEMOGLOBIN A1C   Result Value Ref Range    Hemoglobin A1c (POC) 6.8 %

## 2023-02-28 NOTE — PROGRESS NOTES
Chief Complaint   Patient presents with    Follow-up       HPI:    Tez Sarmiento is a 80 y.o. female who presents for chronic follow-up; she is accompanied by her daughter Janice Mukherjee who helps provide HPI. Her daughter passed away recently; granddaughter being secretive, wouldn't let anyone visit before her death. HTN:  Compliant with metoprolol and diltiazem; does not check BP at home. Denies CP, palpitations, SOB, BURT. PAF:  Details of this are unclear; was noted to be in SR during an ER visit in November 2021. Remains compliant with diltiazem and metoprolol; does not follow with cardiology. Previously on warfarin but this was stopped to due to rectal bleeding and hx multiple falls. HLD:  Compliant with simvastatin. DM:  Diagnosed many years ago; compliant with Humulin 70/30 at once daily dosing. She does not check sugars at home. No recent episodes of hypoglycemia. CKD: Creatinine declining 1.49-->1.93, with albuminuria. Does not follow with urology. IBS:  Ongoing issue for many years, mostly diarrhea-type with occasional constipation. Has improved over the past several months since moving in with her daughter; daughter avoids triggering foods in her diet including starches, certain fruits and vegetables. Has long history of hemorrhoidal bleeding but this has also improved. Dermatitis:  Red, inflamed rash under breasts and abdominal and groin folds. Uses OTC cream with tea tree oil daily and tries to keep area dry; did not find Nystatin powder effective so she hasn't been using this. 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 Funny nose and watery eyes improved after injections.     Allergies   Allergen Reactions    Penicillins Hives    Sulfa (Sulfonamide Antibiotics) Hives       Current Outpatient Medications   Medication Sig    cetirizine (ZYRTEC) 10 mg tablet Take 1 Tablet by mouth daily. simvastatin (ZOCOR) 20 mg tablet Take 1 Tablet by mouth daily. metoprolol succinate (TOPROL-XL) 50 mg XL tablet Take 1 Tablet by mouth daily. dilTIAZem ER (TIAZAC) 120 mg capsule Take 1 Capsule by mouth daily. furosemide (LASIX) 40 mg tablet Take 20 mg by mouth two (2) times a day. insulin NPH/insulin regular (NOVOLIN 70/30, HUMULIN 70/30) 100 unit/mL (70-30) injection Take 12 Units by SubCUTAneous route in the morning. acetaminophen (TYLENOL) 650 mg TbER Take 650 mg by mouth every eight (8) hours. aspirin delayed-release 81 mg tablet Take 81 mg by mouth in the morning. No current facility-administered medications for this visit. History reviewed. No pertinent past medical history. History reviewed. No pertinent family history. Review of Systems   Constitutional: Negative. Negative for chills, fever and malaise/fatigue. HENT: Negative. Eyes: Negative. Respiratory: Negative. Negative for cough and shortness of breath. Cardiovascular: Negative. Negative for chest pain, palpitations and leg swelling. Gastrointestinal: Negative. Negative for abdominal pain, nausea and vomiting. Genitourinary: Negative. Musculoskeletal: Negative. Skin:  Positive for rash. Neurological: Negative. Negative for dizziness and headaches. Endo/Heme/Allergies: Negative. Psychiatric/Behavioral: Negative. Negative for depression. The patient is not nervous/anxious.        /60 (BP 1 Location: Left upper arm, BP Patient Position: Sitting)   Pulse 65   Resp 18   Ht 5' 3\" (1.6 m)   Wt 173 lb (78.5 kg)   SpO2 100%   BMI 30.65 kg/m²     Wt Readings from Last 3 Encounters:   02/28/23 173 lb (78.5 kg)   11/10/22 174 lb 6.4 oz (79.1 kg)   08/10/22 172 lb 3.2 oz (78.1 kg)       3 most recent PHQ Screens 11/10/2022   Little interest or pleasure in doing things Not at all   Feeling down, depressed, irritable, or hopeless Not at all   Total Score PHQ 2 0   Trouble falling or staying asleep, or sleeping too much -   Feeling tired or having little energy -   Poor appetite, weight loss, or overeating -   Feeling bad about yourself - or that you are a failure or have let yourself or your family down -   Trouble concentrating on things such as school, work, reading, or watching TV -   Moving or speaking so slowly that other people could have noticed; or the opposite being so fidgety that others notice -   Thoughts of being better off dead, or hurting yourself in some way -   PHQ 9 Score -       Physical Exam  Vitals and nursing note reviewed. Constitutional:       General: She is not in acute distress. Appearance: Normal appearance. She is obese. HENT:      Head: Normocephalic and atraumatic. Mouth/Throat:      Mouth: Mucous membranes are moist.      Pharynx: Oropharynx is clear. Eyes:      Extraocular Movements: Extraocular movements intact. Conjunctiva/sclera: Conjunctivae normal.      Pupils: Pupils are equal, round, and reactive to light. Neck:      Vascular: No carotid bruit. Cardiovascular:      Rate and Rhythm: Normal rate and regular rhythm. Pulses: Normal pulses. Heart sounds: Normal heart sounds. No murmur heard. No friction rub. No gallop. Pulmonary:      Effort: Pulmonary effort is normal.      Breath sounds: Normal breath sounds. No wheezing, rhonchi or rales. Abdominal:      General: Bowel sounds are normal.      Palpations: Abdomen is soft. Musculoskeletal:         General: Normal range of motion. Cervical back: Normal range of motion and neck supple. Lymphadenopathy:      Cervical: No cervical adenopathy. Skin:     General: Skin is warm and dry. Neurological:      General: No focal deficit present. Mental Status: She is alert and oriented to person, place, and time. Mental status is at baseline.    Psychiatric:         Mood and Affect: Mood normal.         Behavior: Behavior normal.         Thought Content: Thought content normal.         Judgment: Judgment normal.       ASSESSMENT AND PLAN:       ICD-10-CM ICD-9-CM    1. Type 2 diabetes mellitus with stage 3b chronic kidney disease, with long-term current use of insulin (HCC)  E11.22 250.40 COLLECTION CAPILLARY BLOOD SPECIMEN    N18.32 585.3 AMB POC HEMOGLOBIN A1C    Z79.4 V58.67       2. Paroxysmal atrial fibrillation (HCC)  I48.0 427.31       3. Primary hypertension  I10 401.9       4. Candidal intertrigo  B37.2 112.3       5. Venous stasis dermatitis of both lower extremities  I87.2 454.1       6. Mixed hyperlipidemia  E78.2 272.2           A1C 6.8% today in office; continue current medication regimen without change. Discussed continued care of intertrigo; keep area dry, keep skin folds from touching by using soft cloth or articles of clothing, apply topical treatments as necessary. Medication Side Effects and Warnings were discussed with patient:  yes  Patient Labs were reviewed:  yes  Patient Past Records were reviewed:  yes      Patient aware of plan of care and verbalized understanding. Questions answered. RTC 6 months or sooner if needed. On this date 02/28/2023 I have spent 30 minutes reviewing previous notes, test results and face to face with the patient discussing the diagnosis and importance of compliance with the treatment plan as well as documenting on the day of the visit.     Placido Lazo NP

## 2023-02-28 NOTE — ACP (ADVANCE CARE PLANNING)
Advance Care Planning     Advance Care Planning (ACP) Physician/NP/PA Conversation      Date of Conversation: 2/28/2023  Conducted with: Patient with Decision Making Capacity and Healthcare Decision Maker: Named in Advance Directive or [address]:     Primary Decision Maker: Tayler Walsh - Daughter - [phone]  Click here to [address] including selection of the Healthcare Decision Maker Relationship (ie \"Primary\")      Today we documented Decision Maker(s) consistent with Legal Next of Kin hierarchy. Care Preferences:    Hospitalization: \"If your health worsens and it becomes clear that your chance of recovery is unlikely, what would be your preference regarding hospitalization? \"  The patient would prefer comfort-focused treatment without hospitalization. Ventilation: \"If you were unable to breathe on your own and your chance of recovery was unlikely, what would be your preference about the use of a ventilator (breathing machine) if it was available to you? \"   The patient would NOT desire the use of a ventilator. Resuscitation: \"In the event your heart stopped as a result of an underlying serious health condition, would you want attempts to be made to restart your heart, or would you prefer a natural death? \"   No, do NOT attempt to resuscitate.     Additional topics discussed: treatment goals    Conversation Outcomes / Follow-Up Plan:   ACP complete - no further action today  Reviewed DNR/DNI and patient elects DNR order - completed portable DNR form & placed order     Length of Voluntary ACP Conversation in minutes:  <16 minutes (Non-Billable)    Debra Rivera NP

## 2023-04-17 DIAGNOSIS — I48.0 PAROXYSMAL ATRIAL FIBRILLATION (HCC): ICD-10-CM

## 2023-04-17 DIAGNOSIS — I10 PRIMARY HYPERTENSION: ICD-10-CM

## 2023-04-17 RX ORDER — METOPROLOL SUCCINATE 50 MG/1
50 TABLET, EXTENDED RELEASE ORAL DAILY
Qty: 90 TABLET | Refills: 1 | Status: SHIPPED | OUTPATIENT
Start: 2023-04-17

## 2023-05-10 RX ORDER — CETIRIZINE HYDROCHLORIDE 10 MG/1
10 TABLET ORAL DAILY
Qty: 90 TABLET | Refills: 3 | Status: SHIPPED | OUTPATIENT
Start: 2023-05-10

## 2023-05-16 DIAGNOSIS — E11.22 TYPE 2 DIABETES MELLITUS WITH STAGE 3A CHRONIC KIDNEY DISEASE, WITH LONG-TERM CURRENT USE OF INSULIN (HCC): Primary | ICD-10-CM

## 2023-05-16 DIAGNOSIS — N18.31 TYPE 2 DIABETES MELLITUS WITH STAGE 3A CHRONIC KIDNEY DISEASE, WITH LONG-TERM CURRENT USE OF INSULIN (HCC): Primary | ICD-10-CM

## 2023-05-16 DIAGNOSIS — Z79.4 TYPE 2 DIABETES MELLITUS WITH STAGE 3A CHRONIC KIDNEY DISEASE, WITH LONG-TERM CURRENT USE OF INSULIN (HCC): Primary | ICD-10-CM

## 2023-05-16 NOTE — TELEPHONE ENCOUNTER
Medication Refill Request    Kelly Hernández is requesting a refill of the following medication(s):     insulin 70-30 (HUMULIN;NOVOLIN) (70-30) 100 UNIT per ML injection vial    Please send refill to:     [address], [address]  [address]  [address]  Phone: [phone] Fax: [phone]

## 2023-05-26 RX ORDER — DILTIAZEM HYDROCHLORIDE 120 MG/1
CAPSULE, EXTENDED RELEASE ORAL
Qty: 90 CAPSULE | Refills: 1 | Status: SHIPPED | OUTPATIENT
Start: 2023-05-26

## 2023-06-07 ENCOUNTER — TELEPHONE (OUTPATIENT)
Age: 88
End: 2023-06-07

## 2023-06-07 DIAGNOSIS — Z79.4 TYPE 2 DIABETES MELLITUS WITH STAGE 3B CHRONIC KIDNEY DISEASE, WITH LONG-TERM CURRENT USE OF INSULIN (HCC): Primary | ICD-10-CM

## 2023-06-07 DIAGNOSIS — E11.22 TYPE 2 DIABETES MELLITUS WITH STAGE 3B CHRONIC KIDNEY DISEASE, WITH LONG-TERM CURRENT USE OF INSULIN (HCC): Primary | ICD-10-CM

## 2023-06-07 DIAGNOSIS — N18.32 TYPE 2 DIABETES MELLITUS WITH STAGE 3B CHRONIC KIDNEY DISEASE, WITH LONG-TERM CURRENT USE OF INSULIN (HCC): Primary | ICD-10-CM

## 2023-06-07 RX ORDER — SYRINGE-NEEDLE,INSULIN,0.5 ML 27GX1/2"
1 SYRINGE, EMPTY DISPOSABLE MISCELLANEOUS DAILY
Qty: 100 EACH | Refills: 3 | Status: SHIPPED | OUTPATIENT
Start: 2023-06-07 | End: 2023-06-07 | Stop reason: SDUPTHER

## 2023-06-07 RX ORDER — SYRINGE-NEEDLE,INSULIN,0.5 ML 27GX1/2"
1 SYRINGE, EMPTY DISPOSABLE MISCELLANEOUS 3 TIMES DAILY
Qty: 300 EACH | Refills: 3 | Status: SHIPPED | OUTPATIENT
Start: 2023-06-07

## 2023-06-07 NOTE — TELEPHONE ENCOUNTER
Patient wants refill on medication that was last filled by Dr. Al Dickey. TRUE PLUS INS SYR 0.5ml 30G x 5/16. Use as directed three times a day.

## 2023-07-18 RX ORDER — FUROSEMIDE 20 MG/1
TABLET ORAL
Qty: 180 TABLET | Refills: 1 | Status: SHIPPED | OUTPATIENT
Start: 2023-07-18

## 2023-08-14 RX ORDER — SIMVASTATIN 20 MG
20 TABLET ORAL DAILY
Qty: 90 TABLET | Refills: 1 | Status: SHIPPED | OUTPATIENT
Start: 2023-08-14

## 2023-09-02 DIAGNOSIS — E11.22 TYPE 2 DIABETES MELLITUS WITH STAGE 3A CHRONIC KIDNEY DISEASE, WITH LONG-TERM CURRENT USE OF INSULIN (HCC): ICD-10-CM

## 2023-09-02 DIAGNOSIS — Z79.4 TYPE 2 DIABETES MELLITUS WITH STAGE 3A CHRONIC KIDNEY DISEASE, WITH LONG-TERM CURRENT USE OF INSULIN (HCC): ICD-10-CM

## 2023-09-02 DIAGNOSIS — N18.31 TYPE 2 DIABETES MELLITUS WITH STAGE 3A CHRONIC KIDNEY DISEASE, WITH LONG-TERM CURRENT USE OF INSULIN (HCC): ICD-10-CM

## 2023-09-05 RX ORDER — HUMAN INSULIN 100 [USP'U]/ML
INJECTION, SUSPENSION SUBCUTANEOUS
Qty: 10 ML | Refills: 1 | Status: SHIPPED | OUTPATIENT
Start: 2023-09-05

## 2023-09-29 ENCOUNTER — OFFICE VISIT (OUTPATIENT)
Age: 88
End: 2023-09-29
Payer: MEDICARE

## 2023-09-29 VITALS
BODY MASS INDEX: 31.22 KG/M2 | RESPIRATION RATE: 18 BRPM | DIASTOLIC BLOOD PRESSURE: 64 MMHG | HEART RATE: 98 BPM | WEIGHT: 176.2 LBS | HEIGHT: 63 IN | SYSTOLIC BLOOD PRESSURE: 118 MMHG | OXYGEN SATURATION: 100 %

## 2023-09-29 DIAGNOSIS — I48.0 PAROXYSMAL ATRIAL FIBRILLATION (HCC): ICD-10-CM

## 2023-09-29 DIAGNOSIS — N18.32 CHRONIC KIDNEY DISEASE, STAGE 3B (HCC): ICD-10-CM

## 2023-09-29 DIAGNOSIS — E78.2 MIXED HYPERLIPIDEMIA: ICD-10-CM

## 2023-09-29 DIAGNOSIS — Z00.00 MEDICARE ANNUAL WELLNESS VISIT, SUBSEQUENT: Primary | ICD-10-CM

## 2023-09-29 DIAGNOSIS — Z23 ENCOUNTER FOR IMMUNIZATION: ICD-10-CM

## 2023-09-29 DIAGNOSIS — I10 ESSENTIAL (PRIMARY) HYPERTENSION: ICD-10-CM

## 2023-09-29 DIAGNOSIS — N18.31 TYPE 2 DIABETES MELLITUS WITH STAGE 3A CHRONIC KIDNEY DISEASE, WITH LONG-TERM CURRENT USE OF INSULIN (HCC): ICD-10-CM

## 2023-09-29 DIAGNOSIS — Z79.4 TYPE 2 DIABETES MELLITUS WITH STAGE 3A CHRONIC KIDNEY DISEASE, WITH LONG-TERM CURRENT USE OF INSULIN (HCC): ICD-10-CM

## 2023-09-29 DIAGNOSIS — F03.92 HALLUCINATIONS DUE TO LATE ONSET DEMENTIA (HCC): ICD-10-CM

## 2023-09-29 DIAGNOSIS — E11.22 TYPE 2 DIABETES MELLITUS WITH STAGE 3A CHRONIC KIDNEY DISEASE, WITH LONG-TERM CURRENT USE OF INSULIN (HCC): ICD-10-CM

## 2023-09-29 LAB
BASOPHILS # BLD: 0.1 K/UL (ref 0–0.1)
BASOPHILS NFR BLD: 1 % (ref 0–1)
DIFFERENTIAL METHOD BLD: NORMAL
EOSINOPHIL # BLD: 0.1 K/UL (ref 0–0.4)
EOSINOPHIL NFR BLD: 2 % (ref 0–7)
ERYTHROCYTE [DISTWIDTH] IN BLOOD BY AUTOMATED COUNT: 13.3 % (ref 11.5–14.5)
HBA1C MFR BLD: 6.8 %
HCT VFR BLD AUTO: 45 % (ref 35–47)
HGB BLD-MCNC: 13.9 G/DL (ref 11.5–16)
IMM GRANULOCYTES # BLD AUTO: 0 K/UL (ref 0–0.04)
IMM GRANULOCYTES NFR BLD AUTO: 0 % (ref 0–0.5)
LYMPHOCYTES # BLD: 1.8 K/UL (ref 0.8–3.5)
LYMPHOCYTES NFR BLD: 21 % (ref 12–49)
MCH RBC QN AUTO: 30 PG (ref 26–34)
MCHC RBC AUTO-ENTMCNC: 30.9 G/DL (ref 30–36.5)
MCV RBC AUTO: 97 FL (ref 80–99)
MONOCYTES # BLD: 0.8 K/UL (ref 0–1)
MONOCYTES NFR BLD: 10 % (ref 5–13)
NEUTS SEG # BLD: 5.6 K/UL (ref 1.8–8)
NEUTS SEG NFR BLD: 66 % (ref 32–75)
NRBC # BLD: 0 K/UL (ref 0–0.01)
NRBC BLD-RTO: 0 PER 100 WBC
PLATELET # BLD AUTO: 198 K/UL (ref 150–400)
PMV BLD AUTO: 12.3 FL (ref 8.9–12.9)
RBC # BLD AUTO: 4.64 M/UL (ref 3.8–5.2)
WBC # BLD AUTO: 8.3 K/UL (ref 3.6–11)

## 2023-09-29 PROCEDURE — 83036 HEMOGLOBIN GLYCOSYLATED A1C: CPT

## 2023-09-29 ASSESSMENT — LIFESTYLE VARIABLES
HOW OFTEN DO YOU HAVE A DRINK CONTAINING ALCOHOL: NEVER
HOW MANY STANDARD DRINKS CONTAINING ALCOHOL DO YOU HAVE ON A TYPICAL DAY: PATIENT DOES NOT DRINK

## 2023-09-29 ASSESSMENT — PATIENT HEALTH QUESTIONNAIRE - PHQ9
SUM OF ALL RESPONSES TO PHQ QUESTIONS 1-9: 0
SUM OF ALL RESPONSES TO PHQ QUESTIONS 1-9: 0
1. LITTLE INTEREST OR PLEASURE IN DOING THINGS: 0
SUM OF ALL RESPONSES TO PHQ QUESTIONS 1-9: 0
SUM OF ALL RESPONSES TO PHQ QUESTIONS 1-9: 0
2. FEELING DOWN, DEPRESSED OR HOPELESS: 0
SUM OF ALL RESPONSES TO PHQ9 QUESTIONS 1 & 2: 0

## 2023-09-29 NOTE — ACP (ADVANCE CARE PLANNING)
Discussed importance of advanced medical directives with patient. Patient is capable of making decisions.     Kamala Watts, CLARI - NP

## 2023-09-29 NOTE — PATIENT INSTRUCTIONS
toothpaste dispensers, and floss holders. Your doctor may recommend a soft-bristle toothbrush if the person you care for bleeds easily. Bleeding can happen because of a health problem or from certain medicines. A toothpaste for sensitive teeth may help if the person you care for has sensitive teeth. How do you brush and floss someone's teeth? If the person you are caring for has a hard time cleaning their teeth on their own, you may need to brush and floss their teeth for them. It may be easiest to have the person sit and face away from you, and to sit or stand behind them. That way you can steady their head against your arm as you reach around to floss and brush their teeth. Choose a place that has good lighting and is comfortable for both of you. Before you begin, gather your supplies. You will need gloves, floss, a toothbrush, and a container to hold water if you are not near a sink. Wash and dry your hands well and put on gloves. Start by flossing:  Gently work a piece of floss between each of the teeth toward the gums. A plastic flossing tool may make this easier, and they are available at [facility]. Curve the floss around each tooth into a U-shape and gently slide it under the gum line. Move the floss firmly up and down several times to scrape off the plaque. After you've finished flossing, throw away the used floss and begin brushing:  Wet the brush and apply toothpaste. Place the brush at a 45-degree angle where the teeth meet the gums. Press firmly, and move the brush in small circles over the surface of the teeth. Be careful not to brush too hard. Vigorous brushing can make the gums pull away from the teeth and can scratch the tooth enamel. Brush all surfaces of the teeth, on the tongue side and on the cheek side. Pay special attention to the front teeth and all surfaces of the back teeth. Brush chewing surfaces with short back-and-forth strokes.   After you've finished, help the person

## 2023-09-29 NOTE — PROGRESS NOTES
Medicare Annual Wellness Visit    Amos Luque is here for Wound Check, Medicare AWV, and Insomnia (Does not sleep well/Daughter states tv people are talking to her. )    Assessment & Plan   Medicare annual wellness visit, subsequent  Type 2 diabetes mellitus with stage 3a chronic kidney disease, with long-term current use of insulin (HCC)  -     MS COLLECTION VENOUS BLOOD VENIPUNCTURE  -     AMB POC HEMOGLOBIN A1C  -     CBC with Auto Differential; Future  -     Comprehensive Metabolic Panel; Future  Chronic kidney disease, stage 3b (HCC)  -     MS COLLECTION VENOUS BLOOD VENIPUNCTURE  -     CBC with Auto Differential; Future  -     Comprehensive Metabolic Panel; Future  Paroxysmal atrial fibrillation (HCC)  Essential (primary) hypertension  -     MS COLLECTION VENOUS BLOOD VENIPUNCTURE  -     Comprehensive Metabolic Panel; Future  Mixed hyperlipidemia  Hallucinations due to late onset dementia ([address])  Encounter for immunization  -     MS IM ADM PRQ ID SUBQ/IM NJXS 1 VACCINE  -     Influenza, FLUAD, (age 72 y+), IM, PF, 0.5 mL    A1C 6.8% in office today; continue current treatment regimen without changes. Normotensive today in office; continue current regimen without changes. Continue to keep wound clean and dry, may apply Vaseline as needed; recommend daily use of compression hose, keep legs elevated, limit dietary sodium. Daughter concerned about hallucinations, but are not worrisome to the patient; I do not believe medication SE outweighs benefit at this point. Consider medication management or psych consult if worsening or becoming concerning for patient. Recommendations for Preventive Services Due: see orders and patient instructions/AVS.  Recommended screening schedule for the next 5-10 years is provided to the patient in written form: see Patient Instructions/AVS.     Return in 3 months (on 12/29/2023).      Subjective   The following acute and/or chronic problems were also addressed

## 2023-09-30 LAB
ALBUMIN SERPL-MCNC: 3.9 G/DL (ref 3.5–5)
ALBUMIN/GLOB SERPL: 1.1 (ref 1.1–2.2)
ALP SERPL-CCNC: 129 U/L (ref 45–117)
ALT SERPL-CCNC: 14 U/L (ref 12–78)
ANION GAP SERPL CALC-SCNC: 3 MMOL/L (ref 5–15)
AST SERPL-CCNC: 14 U/L (ref 15–37)
BILIRUB SERPL-MCNC: 0.7 MG/DL (ref 0.2–1)
BUN SERPL-MCNC: 38 MG/DL (ref 6–20)
BUN/CREAT SERPL: 22 (ref 12–20)
CALCIUM SERPL-MCNC: 9.5 MG/DL (ref 8.5–10.1)
CHLORIDE SERPL-SCNC: 108 MMOL/L (ref 97–108)
CO2 SERPL-SCNC: 30 MMOL/L (ref 21–32)
CREAT SERPL-MCNC: 1.74 MG/DL (ref 0.55–1.02)
GLOBULIN SER CALC-MCNC: 3.5 G/DL (ref 2–4)
GLUCOSE SERPL-MCNC: 82 MG/DL (ref 65–100)
POTASSIUM SERPL-SCNC: 5 MMOL/L (ref 3.5–5.1)
PROT SERPL-MCNC: 7.4 G/DL (ref 6.4–8.2)
SODIUM SERPL-SCNC: 141 MMOL/L (ref 136–145)

## 2023-12-04 RX ORDER — DILTIAZEM HYDROCHLORIDE 120 MG/1
CAPSULE, EXTENDED RELEASE ORAL
Qty: 90 CAPSULE | Refills: 1 | Status: SHIPPED | OUTPATIENT
Start: 2023-12-04

## 2023-12-27 ENCOUNTER — APPOINTMENT (OUTPATIENT)
Facility: HOSPITAL | Age: 88
End: 2023-12-27
Payer: MEDICARE

## 2023-12-27 ENCOUNTER — HOSPITAL ENCOUNTER (EMERGENCY)
Facility: HOSPITAL | Age: 88
Discharge: HOME | End: 2023-12-27
Attending: EMERGENCY MEDICINE
Payer: MEDICARE

## 2023-12-27 VITALS
HEIGHT: 63 IN | RESPIRATION RATE: 16 BRPM | OXYGEN SATURATION: 98 % | HEART RATE: 89 BPM | BODY MASS INDEX: 31.36 KG/M2 | SYSTOLIC BLOOD PRESSURE: 137 MMHG | TEMPERATURE: 97.7 F | WEIGHT: 177 LBS | DIASTOLIC BLOOD PRESSURE: 73 MMHG

## 2023-12-27 DIAGNOSIS — R31.9 HEMATURIA, UNSPECIFIED TYPE: Primary | ICD-10-CM

## 2023-12-27 DIAGNOSIS — N28.89 KIDNEY MASS: ICD-10-CM

## 2023-12-27 LAB
ABO + RH BLD: NORMAL
ALBUMIN SERPL-MCNC: 3.3 G/DL (ref 3.5–5)
ALBUMIN/GLOB SERPL: 0.8 (ref 1.1–2.2)
ALP SERPL-CCNC: 112 U/L (ref 45–117)
ALT SERPL-CCNC: 10 U/L (ref 12–78)
ANION GAP SERPL CALC-SCNC: 9 MMOL/L (ref 5–15)
APPEARANCE UR: CLEAR
AST SERPL-CCNC: 12 U/L (ref 15–37)
BACTERIA URNS QL MICRO: NEGATIVE /HPF
BASOPHILS # BLD: 0 K/UL (ref 0–0.1)
BASOPHILS NFR BLD: 1 % (ref 0–1)
BILIRUB SERPL-MCNC: 0.5 MG/DL (ref 0.2–1)
BILIRUB UR QL: NEGATIVE
BLOOD GROUP ANTIBODIES SERPL: NORMAL
BUN SERPL-MCNC: 40 MG/DL (ref 6–20)
BUN/CREAT SERPL: 20 (ref 12–20)
CALCIUM SERPL-MCNC: 8.9 MG/DL (ref 8.5–10.1)
CHLORIDE SERPL-SCNC: 108 MMOL/L (ref 97–108)
CO2 SERPL-SCNC: 28 MMOL/L (ref 21–32)
COLOR UR: ABNORMAL
CREAT SERPL-MCNC: 1.96 MG/DL (ref 0.55–1.02)
DIFFERENTIAL METHOD BLD: ABNORMAL
EKG ATRIAL RATE: 97 BPM
EKG DIAGNOSIS: NORMAL
EKG Q-T INTERVAL: 438 MS
EKG QRS DURATION: 126 MS
EKG QTC CALCULATION (BAZETT): 505 MS
EKG R AXIS: -49 DEGREES
EKG T AXIS: 9 DEGREES
EKG VENTRICULAR RATE: 80 BPM
EOSINOPHIL # BLD: 0.1 K/UL (ref 0–0.4)
EOSINOPHIL NFR BLD: 1 % (ref 0–7)
EPITH CASTS URNS QL MICRO: ABNORMAL /LPF
ERYTHROCYTE [DISTWIDTH] IN BLOOD BY AUTOMATED COUNT: 13.2 % (ref 11.5–14.5)
ERYTHROCYTE [DISTWIDTH] IN BLOOD BY AUTOMATED COUNT: 13.2 % (ref 11.5–14.5)
GLOBULIN SER CALC-MCNC: 3.9 G/DL (ref 2–4)
GLUCOSE SERPL-MCNC: 129 MG/DL (ref 65–100)
GLUCOSE UR STRIP.AUTO-MCNC: NEGATIVE MG/DL
HCT VFR BLD AUTO: 38 % (ref 35–47)
HCT VFR BLD AUTO: 40.7 % (ref 35–47)
HGB BLD-MCNC: 12 G/DL (ref 11.5–16)
HGB BLD-MCNC: 12.7 G/DL (ref 11.5–16)
HGB UR QL STRIP: ABNORMAL
IMM GRANULOCYTES # BLD AUTO: 0 K/UL (ref 0–0.04)
IMM GRANULOCYTES NFR BLD AUTO: 0 % (ref 0–0.5)
INR PPP: 1.1 (ref 0.9–1.1)
KETONES UR QL STRIP.AUTO: NEGATIVE MG/DL
LEUKOCYTE ESTERASE UR QL STRIP.AUTO: NEGATIVE
LYMPHOCYTES # BLD: 0.9 K/UL (ref 0.8–3.5)
LYMPHOCYTES NFR BLD: 11 % (ref 12–49)
MCH RBC QN AUTO: 29.3 PG (ref 26–34)
MCH RBC QN AUTO: 29.3 PG (ref 26–34)
MCHC RBC AUTO-ENTMCNC: 31.2 G/DL (ref 30–36.5)
MCHC RBC AUTO-ENTMCNC: 31.6 G/DL (ref 30–36.5)
MCV RBC AUTO: 92.9 FL (ref 80–99)
MCV RBC AUTO: 94 FL (ref 80–99)
MONOCYTES # BLD: 0.6 K/UL (ref 0–1)
MONOCYTES NFR BLD: 8 % (ref 5–13)
NEUTS SEG # BLD: 6.1 K/UL (ref 1.8–8)
NEUTS SEG NFR BLD: 79 % (ref 32–75)
NITRITE UR QL STRIP.AUTO: NEGATIVE
NRBC # BLD: 0 K/UL (ref 0–0.01)
NRBC # BLD: 0 K/UL (ref 0–0.01)
NRBC BLD-RTO: 0 PER 100 WBC
NRBC BLD-RTO: 0 PER 100 WBC
PH UR STRIP: 6 (ref 5–8)
PLATELET # BLD AUTO: 171 K/UL (ref 150–400)
PLATELET # BLD AUTO: 199 K/UL (ref 150–400)
PMV BLD AUTO: 11.7 FL (ref 8.9–12.9)
PMV BLD AUTO: 11.9 FL (ref 8.9–12.9)
POTASSIUM SERPL-SCNC: 4.2 MMOL/L (ref 3.5–5.1)
PROT SERPL-MCNC: 7.2 G/DL (ref 6.4–8.2)
PROT UR STRIP-MCNC: ABNORMAL MG/DL
PROTHROMBIN TIME: 10.7 SEC (ref 9–11.1)
RBC # BLD AUTO: 4.09 M/UL (ref 3.8–5.2)
RBC # BLD AUTO: 4.33 M/UL (ref 3.8–5.2)
RBC #/AREA URNS HPF: ABNORMAL /HPF (ref 0–5)
SODIUM SERPL-SCNC: 145 MMOL/L (ref 136–145)
SP GR UR REFRACTOMETRY: 1.02 (ref 1–1.03)
SPECIMEN EXP DATE BLD: NORMAL
URINE CULTURE IF INDICATED: ABNORMAL
UROBILINOGEN UR QL STRIP.AUTO: 0.2 EU/DL (ref 0.2–1)
WBC # BLD AUTO: 10.7 K/UL (ref 3.6–11)
WBC # BLD AUTO: 7.7 K/UL (ref 3.6–11)
WBC URNS QL MICRO: ABNORMAL /HPF (ref 0–4)

## 2023-12-27 PROCEDURE — 80053 COMPREHEN METABOLIC PANEL: CPT

## 2023-12-27 PROCEDURE — 85027 COMPLETE CBC AUTOMATED: CPT

## 2023-12-27 PROCEDURE — 74176 CT ABD & PELVIS W/O CONTRAST: CPT

## 2023-12-27 PROCEDURE — 86850 RBC ANTIBODY SCREEN: CPT

## 2023-12-27 PROCEDURE — 99284 EMERGENCY DEPT VISIT MOD MDM: CPT

## 2023-12-27 PROCEDURE — 36415 COLL VENOUS BLD VENIPUNCTURE: CPT

## 2023-12-27 PROCEDURE — 81001 URINALYSIS AUTO W/SCOPE: CPT

## 2023-12-27 PROCEDURE — 85025 COMPLETE CBC W/AUTO DIFF WBC: CPT

## 2023-12-27 PROCEDURE — 85610 PROTHROMBIN TIME: CPT

## 2023-12-27 PROCEDURE — 93005 ELECTROCARDIOGRAM TRACING: CPT | Performed by: EMERGENCY MEDICINE

## 2023-12-27 PROCEDURE — 86900 BLOOD TYPING SEROLOGIC ABO: CPT

## 2023-12-27 PROCEDURE — 86901 BLOOD TYPING SEROLOGIC RH(D): CPT

## 2023-12-27 NOTE — ED TRIAGE NOTES
Pt reports BRB from rectum, that began this morning. She reports subjective large amount of blood in toilet. Pt reports she has a history of hemorrhoids. Also reports fatigue, as well as some lower abd pain intermittent.

## 2023-12-27 NOTE — ED NOTES
This writer attempted IV access x 2 with no success. Pt tolerated well.  at bedside to attempt blood specimen collection.

## 2023-12-27 NOTE — DISCHARGE INSTRUCTIONS
You have a mass on your kidney. It was seen on your CT scan. Your doctor may recommend further testing for this mass. Please follow-up with your primary care doctor for a further assessment. Come back to the emergency department immediately if you experience any blood in stool, black stool, or you feel weak.

## 2024-01-02 ENCOUNTER — OFFICE VISIT (OUTPATIENT)
Age: 89
End: 2024-01-02
Payer: MEDICARE

## 2024-01-02 VITALS
RESPIRATION RATE: 18 BRPM | OXYGEN SATURATION: 99 % | BODY MASS INDEX: 32.07 KG/M2 | HEIGHT: 63 IN | TEMPERATURE: 98 F | HEART RATE: 74 BPM | WEIGHT: 181 LBS | SYSTOLIC BLOOD PRESSURE: 110 MMHG | DIASTOLIC BLOOD PRESSURE: 64 MMHG

## 2024-01-02 DIAGNOSIS — Z09 HOSPITAL DISCHARGE FOLLOW-UP: Primary | ICD-10-CM

## 2024-01-02 DIAGNOSIS — N28.89 LEFT RENAL MASS: ICD-10-CM

## 2024-01-02 PROCEDURE — G8427 DOCREV CUR MEDS BY ELIG CLIN: HCPCS

## 2024-01-02 PROCEDURE — G8417 CALC BMI ABV UP PARAM F/U: HCPCS

## 2024-01-02 PROCEDURE — 1090F PRES/ABSN URINE INCON ASSESS: CPT

## 2024-01-02 PROCEDURE — 1036F TOBACCO NON-USER: CPT

## 2024-01-02 PROCEDURE — 99214 OFFICE O/P EST MOD 30 MIN: CPT

## 2024-01-02 PROCEDURE — G8484 FLU IMMUNIZE NO ADMIN: HCPCS

## 2024-01-02 PROCEDURE — 1123F ACP DISCUSS/DSCN MKR DOCD: CPT

## 2024-01-02 PROCEDURE — 1111F DSCHRG MED/CURRENT MED MERGE: CPT

## 2024-01-02 RX ORDER — FUROSEMIDE 20 MG/1
TABLET ORAL
Qty: 180 TABLET | Refills: 1 | Status: SHIPPED | OUTPATIENT
Start: 2024-01-02

## 2024-01-02 ASSESSMENT — ENCOUNTER SYMPTOMS
EYES NEGATIVE: 1
DIARRHEA: 0
COUGH: 0
RESPIRATORY NEGATIVE: 1
ALLERGIC/IMMUNOLOGIC NEGATIVE: 1
SHORTNESS OF BREATH: 0
BLOOD IN STOOL: 0
CONSTIPATION: 0
WHEEZING: 0

## 2024-01-02 ASSESSMENT — PATIENT HEALTH QUESTIONNAIRE - PHQ9
SUM OF ALL RESPONSES TO PHQ QUESTIONS 1-9: 0
2. FEELING DOWN, DEPRESSED OR HOPELESS: 0
SUM OF ALL RESPONSES TO PHQ QUESTIONS 1-9: 0
1. LITTLE INTEREST OR PLEASURE IN DOING THINGS: 0
SUM OF ALL RESPONSES TO PHQ9 QUESTIONS 1 & 2: 0

## 2024-01-02 NOTE — PROGRESS NOTES
Chief Complaint   Patient presents with    Follow-Up from Hospital     Kidney mass       HPI:     is a 89 y.o. female who presents for ER follow-up.      She was seen at [facility] ER on 12/27 after having an episode of painless hematuria.  Workup included CT abdomen/pelvis which showed suspicious left renal mass, but was otherwise negative. The ER provided Dr. Shields discussed these results with her, including likelihood of malignancy.  She denies further episodes of hematuria, other urinary symptoms, abdominal pain, nausea, vomiting, or other worrisome symptoms.    Allergies   Allergen Reactions    Acetaminophen Hives    Adhesive Tape     Penicillins Hives    Sulfa Antibiotics Hives       Current Outpatient Medications   Medication Sig Dispense Refill    TIADYLT  MG extended release capsule TAKE 1 CAPSULE BY MOUTH DAILY 90 capsule 1    NOVOLIN 70/30 (70-30) 100 UNIT/ML injection vial ADMINISTER 12 UNITS UNDER THE SKIN DAILY 10 mL 1    simvastatin (ZOCOR) 20 MG tablet TAKE 1 TABLET BY MOUTH DAILY 90 tablet 1    Insulin Syringe-Needle U-100 ([employer] INSULIN SYR 0.5CC/30G) 30G X 5/16\" 0.5 ML MISC 1 each by Does not apply route 3 times daily 300 each 3    cetirizine (ZYRTEC) 10 MG tablet TAKE 1 TABLET BY MOUTH DAILY 90 tablet 3    acetaminophen (TYLENOL) 650 MG extended release tablet Take 1 tablet by mouth in the morning and 1 tablet at noon and 1 tablet in the evening. 2 every morning and 2 every night .      aspirin 81 MG EC tablet Take 1 tablet by mouth daily      metoprolol succinate (TOPROL XL) 50 MG extended release tablet Take 1 tablet by mouth daily      furosemide (LASIX) 20 MG tablet TAKE 2 TABLETS BY MOUTH DAILY 180 tablet 1     No current facility-administered medications for this visit.         History reviewed. No pertinent past medical history.    History reviewed. No pertinent family history.    Review of Systems   Constitutional:  Negative for chills, fatigue and fever.   HENT: Negative.

## 2024-01-02 NOTE — PROGRESS NOTES
\"Have you been to the ER, urgent care clinic since your last visit?  Hospitalized since your last visit?\"    YES - When: approximately 6 days ago.  Where and Why: H Kidney mass.    “Have you seen or consulted any other health care providers outside of [facility] since your last visit?”    NO         Chief Complaint   Patient presents with    Follow-Up from Hospital     Kidney mass       Vitals:    01/02/24 1304   BP: 110/64   Pulse: 74   Resp: 18   Temp: 98 °F (36.7 °C)   SpO2: 99%

## 2024-01-03 NOTE — TELEPHONE ENCOUNTER
Patient requesting refill on     Requested Prescriptions     Pending Prescriptions Disp Refills    metoprolol succinate (TOPROL XL) 50 MG extended release tablet [Pharmacy Med Name: METOPROLOL ER SUCCINATE 50MG TABS] 90 tablet      Sig: TAKE 1 TABLET BY MOUTH DAILY        Last OV 1/2/2024

## 2024-01-04 RX ORDER — METOPROLOL SUCCINATE 50 MG/1
50 TABLET, EXTENDED RELEASE ORAL DAILY
Qty: 90 TABLET | Refills: 1 | Status: SHIPPED | OUTPATIENT
Start: 2024-01-04

## 2024-02-06 RX ORDER — SIMVASTATIN 20 MG
20 TABLET ORAL DAILY
Qty: 90 TABLET | Refills: 1 | Status: SHIPPED | OUTPATIENT
Start: 2024-02-06

## 2024-02-06 RX ORDER — CETIRIZINE HYDROCHLORIDE 10 MG/1
10 TABLET ORAL DAILY
Qty: 90 TABLET | Refills: 3 | Status: SHIPPED | OUTPATIENT
Start: 2024-02-06

## 2024-03-06 DIAGNOSIS — E11.22 TYPE 2 DIABETES MELLITUS WITH STAGE 3A CHRONIC KIDNEY DISEASE, WITH LONG-TERM CURRENT USE OF INSULIN (HCC): ICD-10-CM

## 2024-03-06 DIAGNOSIS — N18.31 TYPE 2 DIABETES MELLITUS WITH STAGE 3A CHRONIC KIDNEY DISEASE, WITH LONG-TERM CURRENT USE OF INSULIN (HCC): ICD-10-CM

## 2024-03-06 DIAGNOSIS — Z79.4 TYPE 2 DIABETES MELLITUS WITH STAGE 3A CHRONIC KIDNEY DISEASE, WITH LONG-TERM CURRENT USE OF INSULIN (HCC): ICD-10-CM

## 2024-03-06 RX ORDER — HUMAN INSULIN 100 [USP'U]/ML
INJECTION, SUSPENSION SUBCUTANEOUS
Qty: 10 ML | Refills: 1 | Status: SHIPPED | OUTPATIENT
Start: 2024-03-06

## 2024-03-18 ENCOUNTER — TELEPHONE (OUTPATIENT)
Age: 89
End: 2024-03-18

## 2024-03-18 DIAGNOSIS — E11.22 TYPE 2 DIABETES MELLITUS WITH STAGE 3B CHRONIC KIDNEY DISEASE, WITH LONG-TERM CURRENT USE OF INSULIN (HCC): ICD-10-CM

## 2024-03-18 DIAGNOSIS — Z79.4 TYPE 2 DIABETES MELLITUS WITH STAGE 3B CHRONIC KIDNEY DISEASE, WITH LONG-TERM CURRENT USE OF INSULIN (HCC): ICD-10-CM

## 2024-03-18 DIAGNOSIS — N18.32 TYPE 2 DIABETES MELLITUS WITH STAGE 3B CHRONIC KIDNEY DISEASE, WITH LONG-TERM CURRENT USE OF INSULIN (HCC): ICD-10-CM

## 2024-03-18 NOTE — TELEPHONE ENCOUNTER
Medication Refill Request    Elias Bhandari is requesting a refill of the following medication(s):   Insulin Syringe-Needle U-100 ([employer] INSULIN SYR 0.5CC/30G) 30G X 5/16\" 0.5 ML MISC   Please send refill to:     [id number] - [place] - [id number] - P [phone] - F [phone] [address] [place] [address]  Phone: [phone] Fax: [phone]

## 2024-03-19 RX ORDER — SYRINGE-NEEDLE,INSULIN,0.5 ML 27GX1/2"
1 SYRINGE, EMPTY DISPOSABLE MISCELLANEOUS 3 TIMES DAILY
Qty: 300 EACH | Refills: 3 | Status: SHIPPED | OUTPATIENT
Start: 2024-03-19

## 2024-03-19 NOTE — TELEPHONE ENCOUNTER
Patient requesting refill on     Requested Prescriptions     Pending Prescriptions Disp Refills    Insulin Syringe-Needle U-100 ([employer] INSULIN SYR 0.5CC/30G) 30G X 5/16\" 0.5 ML MISC 300 each 3     Si each by Does not apply route 3 times daily        Last OV 2024

## 2024-03-21 DIAGNOSIS — Z79.4 TYPE 2 DIABETES MELLITUS WITH STAGE 3A CHRONIC KIDNEY DISEASE, WITH LONG-TERM CURRENT USE OF INSULIN (HCC): ICD-10-CM

## 2024-03-21 DIAGNOSIS — E11.22 TYPE 2 DIABETES MELLITUS WITH STAGE 3A CHRONIC KIDNEY DISEASE, WITH LONG-TERM CURRENT USE OF INSULIN (HCC): ICD-10-CM

## 2024-03-21 DIAGNOSIS — N18.31 TYPE 2 DIABETES MELLITUS WITH STAGE 3A CHRONIC KIDNEY DISEASE, WITH LONG-TERM CURRENT USE OF INSULIN (HCC): ICD-10-CM

## 2024-03-22 RX ORDER — HUMAN INSULIN 100 [USP'U]/ML
INJECTION, SUSPENSION SUBCUTANEOUS
Qty: 10 ML | Refills: 1 | OUTPATIENT
Start: 2024-03-22

## 2024-04-05 ENCOUNTER — OFFICE VISIT (OUTPATIENT)
Age: 89
End: 2024-04-05
Payer: MEDICARE

## 2024-04-05 VITALS
WEIGHT: 179 LBS | RESPIRATION RATE: 18 BRPM | TEMPERATURE: 98.1 F | SYSTOLIC BLOOD PRESSURE: 118 MMHG | DIASTOLIC BLOOD PRESSURE: 64 MMHG | HEART RATE: 74 BPM | OXYGEN SATURATION: 100 % | HEIGHT: 63 IN | BODY MASS INDEX: 31.71 KG/M2

## 2024-04-05 DIAGNOSIS — E11.22 TYPE 2 DIABETES MELLITUS WITH STAGE 3B CHRONIC KIDNEY DISEASE, WITH LONG-TERM CURRENT USE OF INSULIN (HCC): Primary | ICD-10-CM

## 2024-04-05 DIAGNOSIS — I10 ESSENTIAL (PRIMARY) HYPERTENSION: ICD-10-CM

## 2024-04-05 DIAGNOSIS — I87.2 VENOUS INSUFFICIENCY (CHRONIC) (PERIPHERAL): ICD-10-CM

## 2024-04-05 DIAGNOSIS — Z79.4 TYPE 2 DIABETES MELLITUS WITH STAGE 3B CHRONIC KIDNEY DISEASE, WITH LONG-TERM CURRENT USE OF INSULIN (HCC): Primary | ICD-10-CM

## 2024-04-05 DIAGNOSIS — E55.9 VITAMIN D DEFICIENCY: ICD-10-CM

## 2024-04-05 DIAGNOSIS — L65.9 THINNING HAIR: ICD-10-CM

## 2024-04-05 DIAGNOSIS — F03.92 HALLUCINATIONS DUE TO LATE ONSET DEMENTIA (HCC): ICD-10-CM

## 2024-04-05 DIAGNOSIS — E78.2 MIXED HYPERLIPIDEMIA: ICD-10-CM

## 2024-04-05 DIAGNOSIS — I48.0 PAROXYSMAL ATRIAL FIBRILLATION (HCC): ICD-10-CM

## 2024-04-05 DIAGNOSIS — K58.0 IRRITABLE BOWEL SYNDROME WITH DIARRHEA: ICD-10-CM

## 2024-04-05 DIAGNOSIS — N18.32 TYPE 2 DIABETES MELLITUS WITH STAGE 3B CHRONIC KIDNEY DISEASE, WITH LONG-TERM CURRENT USE OF INSULIN (HCC): Primary | ICD-10-CM

## 2024-04-05 LAB — HBA1C MFR BLD: 6.5 %

## 2024-04-05 PROCEDURE — 83036 HEMOGLOBIN GLYCOSYLATED A1C: CPT

## 2024-04-05 RX ORDER — MONTELUKAST SODIUM 4 MG/1
1 TABLET, CHEWABLE ORAL 2 TIMES DAILY
Qty: 60 TABLET | Refills: 3 | Status: SHIPPED | OUTPATIENT
Start: 2024-04-05

## 2024-04-05 SDOH — ECONOMIC STABILITY: HOUSING INSECURITY
IN THE LAST 12 MONTHS, WAS THERE A TIME WHEN YOU DID NOT HAVE A STEADY PLACE TO SLEEP OR SLEPT IN A SHELTER (INCLUDING NOW)?: NO

## 2024-04-05 SDOH — ECONOMIC STABILITY: FOOD INSECURITY: WITHIN THE PAST 12 MONTHS, YOU WORRIED THAT YOUR FOOD WOULD RUN OUT BEFORE YOU GOT MONEY TO BUY MORE.: NEVER TRUE

## 2024-04-05 SDOH — ECONOMIC STABILITY: FOOD INSECURITY: WITHIN THE PAST 12 MONTHS, THE FOOD YOU BOUGHT JUST DIDN'T LAST AND YOU DIDN'T HAVE MONEY TO GET MORE.: NEVER TRUE

## 2024-04-05 SDOH — ECONOMIC STABILITY: INCOME INSECURITY: HOW HARD IS IT FOR YOU TO PAY FOR THE VERY BASICS LIKE FOOD, HOUSING, MEDICAL CARE, AND HEATING?: NOT HARD AT ALL

## 2024-04-05 NOTE — PROGRESS NOTES
\"Have you been to the ER, urgent care clinic since your last visit?  Hospitalized since your last visit?\"    NO    “Have you seen or consulted any other health care providers outside of [facility] since your last visit?”    NO      Chief Complaint   Patient presents with    Diabetes    Hypertension    Dementia     Advancing     Fatigue     No energy  Sleeping more and longer        Vitals:    04/05/24 1018   BP: (!) 138/90   Pulse: 74   Resp: 18   Temp: 98.1 °F (36.7 °C)   SpO2: 100%       Labs drawn from left arm per Gracy Sr NP's orders. Patient tolerated well.

## 2024-04-06 LAB
25(OH)D3 SERPL-MCNC: 28.6 NG/ML (ref 30–100)
ANION GAP SERPL CALC-SCNC: 4 MMOL/L (ref 5–15)
BUN SERPL-MCNC: 34 MG/DL (ref 6–20)
BUN/CREAT SERPL: 19 (ref 12–20)
CALCIUM SERPL-MCNC: 9.2 MG/DL (ref 8.5–10.1)
CHLORIDE SERPL-SCNC: 111 MMOL/L (ref 97–108)
CHOLEST SERPL-MCNC: 80 MG/DL
CO2 SERPL-SCNC: 27 MMOL/L (ref 21–32)
CREAT SERPL-MCNC: 1.78 MG/DL (ref 0.55–1.02)
CREAT UR-MCNC: 26.6 MG/DL
GLUCOSE SERPL-MCNC: 68 MG/DL (ref 65–100)
HDLC SERPL-MCNC: 42 MG/DL
HDLC SERPL: 1.9 (ref 0–5)
LDLC SERPL CALC-MCNC: 22.6 MG/DL (ref 0–100)
MICROALBUMIN UR-MCNC: 2.23 MG/DL
MICROALBUMIN/CREAT UR-RTO: 84 MG/G (ref 0–30)
POTASSIUM SERPL-SCNC: 4.1 MMOL/L (ref 3.5–5.1)
SODIUM SERPL-SCNC: 142 MMOL/L (ref 136–145)
TRIGL SERPL-MCNC: 77 MG/DL
TSH SERPL DL<=0.05 MIU/L-ACNC: 2.57 UIU/ML (ref 0.36–3.74)
VLDLC SERPL CALC-MCNC: 15.4 MG/DL

## 2024-04-09 ENCOUNTER — TELEPHONE (OUTPATIENT)
Age: 89
End: 2024-04-09

## 2024-05-10 NOTE — TELEPHONE ENCOUNTER
Patient requesting refill on     Requested Prescriptions     Pending Prescriptions Disp Refills    TIADYLT  MG extended release capsule [Pharmacy Med Name: TIADYLT ER 120MG CAPSULES (24 HR)] 90 capsule 1     Sig: TAKE 1 CAPSULE BY MOUTH DAILY        Last OV 4/5/2024

## 2024-05-11 RX ORDER — DILTIAZEM HYDROCHLORIDE 120 MG/1
CAPSULE, EXTENDED RELEASE ORAL
Qty: 90 CAPSULE | Refills: 1 | Status: SHIPPED | OUTPATIENT
Start: 2024-05-11

## 2024-05-17 ENCOUNTER — APPOINTMENT (OUTPATIENT)
Facility: HOSPITAL | Age: 89
End: 2024-05-17
Payer: MEDICARE

## 2024-05-17 ENCOUNTER — HOSPITAL ENCOUNTER (EMERGENCY)
Facility: HOSPITAL | Age: 89
Discharge: HOME | End: 2024-05-17
Attending: EMERGENCY MEDICINE
Payer: MEDICARE

## 2024-05-17 VITALS
DIASTOLIC BLOOD PRESSURE: 57 MMHG | HEIGHT: 61 IN | OXYGEN SATURATION: 90 % | WEIGHT: 144 LBS | HEART RATE: 64 BPM | TEMPERATURE: 98 F | BODY MASS INDEX: 27.19 KG/M2 | RESPIRATION RATE: 18 BRPM | SYSTOLIC BLOOD PRESSURE: 133 MMHG

## 2024-05-17 DIAGNOSIS — S41.112A SKIN TEAR OF LEFT UPPER ARM WITHOUT COMPLICATION, INITIAL ENCOUNTER: Primary | ICD-10-CM

## 2024-05-17 DIAGNOSIS — R60.0 BILATERAL LEG EDEMA: ICD-10-CM

## 2024-05-17 LAB
ALBUMIN SERPL-MCNC: 3.3 G/DL (ref 3.5–5)
ALBUMIN/GLOB SERPL: 0.8 (ref 1.1–2.2)
ALP SERPL-CCNC: 114 U/L (ref 45–117)
ALT SERPL-CCNC: 9 U/L (ref 12–78)
ANION GAP SERPL CALC-SCNC: 11 MMOL/L (ref 5–15)
ANION GAP SERPL CALC-SCNC: 12 MMOL/L (ref 5–15)
AST SERPL-CCNC: 24 U/L (ref 15–37)
BASOPHILS # BLD: 0.1 K/UL (ref 0–0.1)
BASOPHILS NFR BLD: 1 % (ref 0–1)
BILIRUB SERPL-MCNC: 0.6 MG/DL (ref 0.2–1)
BUN SERPL-MCNC: 29 MG/DL (ref 6–20)
BUN SERPL-MCNC: 31 MG/DL (ref 6–20)
BUN/CREAT SERPL: 14 (ref 12–20)
BUN/CREAT SERPL: 16 (ref 12–20)
CALCIUM SERPL-MCNC: 8.3 MG/DL (ref 8.5–10.1)
CALCIUM SERPL-MCNC: 8.8 MG/DL (ref 8.5–10.1)
CHLORIDE SERPL-SCNC: 106 MMOL/L (ref 97–108)
CHLORIDE SERPL-SCNC: 107 MMOL/L (ref 97–108)
CO2 SERPL-SCNC: 25 MMOL/L (ref 21–32)
CO2 SERPL-SCNC: 26 MMOL/L (ref 21–32)
CREAT SERPL-MCNC: 1.92 MG/DL (ref 0.55–1.02)
CREAT SERPL-MCNC: 2.04 MG/DL (ref 0.55–1.02)
DIFFERENTIAL METHOD BLD: ABNORMAL
EKG ATRIAL RATE: 220 BPM
EKG DIAGNOSIS: NORMAL
EKG Q-T INTERVAL: 472 MS
EKG QRS DURATION: 130 MS
EKG QTC CALCULATION (BAZETT): 451 MS
EKG R AXIS: -62 DEGREES
EKG T AXIS: 110 DEGREES
EKG VENTRICULAR RATE: 55 BPM
EOSINOPHIL # BLD: 0.2 K/UL (ref 0–0.4)
EOSINOPHIL NFR BLD: 2 % (ref 0–7)
ERYTHROCYTE [DISTWIDTH] IN BLOOD BY AUTOMATED COUNT: 14.9 % (ref 11.5–14.5)
GLOBULIN SER CALC-MCNC: 4 G/DL (ref 2–4)
GLUCOSE BLD STRIP.AUTO-MCNC: 57 MG/DL (ref 65–117)
GLUCOSE BLD STRIP.AUTO-MCNC: 66 MG/DL (ref 65–117)
GLUCOSE BLD STRIP.AUTO-MCNC: 68 MG/DL (ref 65–117)
GLUCOSE SERPL-MCNC: 108 MG/DL (ref 65–100)
GLUCOSE SERPL-MCNC: 56 MG/DL (ref 65–100)
HCT VFR BLD AUTO: 38.9 % (ref 35–47)
HGB BLD-MCNC: 12.1 G/DL (ref 11.5–16)
IMM GRANULOCYTES # BLD AUTO: 0 K/UL (ref 0–0.04)
IMM GRANULOCYTES NFR BLD AUTO: 0 % (ref 0–0.5)
LYMPHOCYTES # BLD: 1.2 K/UL (ref 0.8–3.5)
LYMPHOCYTES NFR BLD: 15 % (ref 12–49)
MCH RBC QN AUTO: 29.4 PG (ref 26–34)
MCHC RBC AUTO-ENTMCNC: 31.1 G/DL (ref 30–36.5)
MCV RBC AUTO: 94.6 FL (ref 80–99)
MONOCYTES # BLD: 0.9 K/UL (ref 0–1)
MONOCYTES NFR BLD: 11 % (ref 5–13)
NEUTS SEG # BLD: 5.8 K/UL (ref 1.8–8)
NEUTS SEG NFR BLD: 71 % (ref 32–75)
NRBC # BLD: 0 K/UL (ref 0–0.01)
NRBC BLD-RTO: 0 PER 100 WBC
NT PRO BNP: ABNORMAL PG/ML (ref 0–450)
PLATELET # BLD AUTO: 151 K/UL (ref 150–400)
PLATELET COMMENT: ABNORMAL
POTASSIUM SERPL-SCNC: 3.8 MMOL/L (ref 3.5–5.1)
POTASSIUM SERPL-SCNC: 4.4 MMOL/L (ref 3.5–5.1)
PROT SERPL-MCNC: 7.3 G/DL (ref 6.4–8.2)
RBC # BLD AUTO: 4.11 M/UL (ref 3.8–5.2)
RBC MORPH BLD: ABNORMAL
SERVICE CMNT-IMP: ABNORMAL
SERVICE CMNT-IMP: NORMAL
SERVICE CMNT-IMP: NORMAL
SODIUM SERPL-SCNC: 143 MMOL/L (ref 136–145)
SODIUM SERPL-SCNC: 144 MMOL/L (ref 136–145)
WBC # BLD AUTO: 8.2 K/UL (ref 3.6–11)

## 2024-05-17 PROCEDURE — 82962 GLUCOSE BLOOD TEST: CPT

## 2024-05-17 PROCEDURE — 83880 ASSAY OF NATRIURETIC PEPTIDE: CPT

## 2024-05-17 PROCEDURE — 6360000002 HC RX W HCPCS: Performed by: EMERGENCY MEDICINE

## 2024-05-17 PROCEDURE — 96372 THER/PROPH/DIAG INJ SC/IM: CPT

## 2024-05-17 PROCEDURE — 70450 CT HEAD/BRAIN W/O DYE: CPT

## 2024-05-17 PROCEDURE — 85025 COMPLETE CBC W/AUTO DIFF WBC: CPT

## 2024-05-17 PROCEDURE — 73090 X-RAY EXAM OF FOREARM: CPT

## 2024-05-17 PROCEDURE — 99285 EMERGENCY DEPT VISIT HI MDM: CPT

## 2024-05-17 PROCEDURE — 36415 COLL VENOUS BLD VENIPUNCTURE: CPT

## 2024-05-17 PROCEDURE — 80053 COMPREHEN METABOLIC PANEL: CPT

## 2024-05-17 RX ORDER — FUROSEMIDE 10 MG/ML
40 INJECTION INTRAMUSCULAR; INTRAVENOUS ONCE
Status: COMPLETED | OUTPATIENT
Start: 2024-05-17 | End: 2024-05-17

## 2024-05-17 RX ADMIN — FUROSEMIDE 40 MG: 10 INJECTION, SOLUTION INTRAMUSCULAR; INTRAVENOUS at 13:41

## 2024-05-17 ASSESSMENT — PAIN - FUNCTIONAL ASSESSMENT: PAIN_FUNCTIONAL_ASSESSMENT: NONE - DENIES PAIN

## 2024-05-17 NOTE — ED TRIAGE NOTES
Pt arrives EMS after a mechanical GLF at home. Pt has ambulation issues d/t bilateral lower extremity swelling. Ambulates with walker. Pt reports swelling has increased over the last week. Pt also states that she has no pain, only a skin tear to left arm.

## 2024-05-17 NOTE — ED NOTES
I have reviewed discharge instructions with the patient and caregiver. The patient and caregiver verbalized understanding. Discharge medications discussed with patient. No questions at this time. Ambulated without difficulty.

## 2024-05-17 NOTE — ED NOTES
Patient educated on the importance of using assistive devices to get around her home. Patient stated that she does have a walker at home.

## 2024-05-17 NOTE — ED NOTES
Patient was able to stand and pivot to bedside commode, however gets shortness of breath with movement. MD made aware.

## 2024-05-17 NOTE — ED PROVIDER NOTES
transcribed by the computer software. Please disregards these errors. Please excuse any errors that have escaped final proofreading.)         Cammie Shields MD  05/17/24 0481

## 2024-05-17 NOTE — DISCHARGE INSTRUCTIONS
Please continue your regular daily medications.  If Elias is still experiencing leg swelling on Saturday and Sunday, you can give her an extra dose of her Lasix medication (Lasix 20 mg) in the evening.  If she is still experiencing leg swelling and worsening weight gain, please bring her back to the emergency department or follow-up with her primary care doctor.

## 2024-05-21 ENCOUNTER — OFFICE VISIT (OUTPATIENT)
Age: 89
End: 2024-05-21
Payer: MEDICARE

## 2024-05-21 VITALS
SYSTOLIC BLOOD PRESSURE: 122 MMHG | BODY MASS INDEX: 35.87 KG/M2 | TEMPERATURE: 97.6 F | RESPIRATION RATE: 24 BRPM | DIASTOLIC BLOOD PRESSURE: 72 MMHG | WEIGHT: 190 LBS | OXYGEN SATURATION: 98 % | HEART RATE: 55 BPM | HEIGHT: 61 IN

## 2024-05-21 DIAGNOSIS — I48.92 ATRIAL FLUTTER, PAROXYSMAL (HCC): ICD-10-CM

## 2024-05-21 DIAGNOSIS — I50.9 ACUTE CONGESTIVE HEART FAILURE, UNSPECIFIED HEART FAILURE TYPE (HCC): Primary | ICD-10-CM

## 2024-05-21 DIAGNOSIS — R29.6 FALLS FREQUENTLY: ICD-10-CM

## 2024-05-21 DIAGNOSIS — R23.8 VESICULAR RASH: ICD-10-CM

## 2024-05-21 DIAGNOSIS — E66.01 SEVERE OBESITY (BMI 35.0-39.9) WITH COMORBIDITY (HCC): ICD-10-CM

## 2024-05-21 DIAGNOSIS — J45.20 MILD INTERMITTENT REACTIVE AIRWAY DISEASE WITHOUT COMPLICATION: ICD-10-CM

## 2024-05-21 DIAGNOSIS — N18.4 STAGE 4 CHRONIC KIDNEY DISEASE (HCC): ICD-10-CM

## 2024-05-21 PROCEDURE — 1123F ACP DISCUSS/DSCN MKR DOCD: CPT

## 2024-05-21 PROCEDURE — 99214 OFFICE O/P EST MOD 30 MIN: CPT

## 2024-05-21 RX ORDER — ALBUTEROL SULFATE 90 UG/1
2 AEROSOL, METERED RESPIRATORY (INHALATION) EVERY 4 HOURS PRN
Qty: 18 G | Refills: 1 | Status: ON HOLD | OUTPATIENT
Start: 2024-05-21

## 2024-05-21 RX ORDER — FUROSEMIDE 20 MG/1
TABLET ORAL
Qty: 30 TABLET | Refills: 0 | Status: ON HOLD | OUTPATIENT
Start: 2024-05-21

## 2024-05-21 RX ORDER — VALACYCLOVIR HYDROCHLORIDE 1 G/1
1000 TABLET, FILM COATED ORAL 3 TIMES DAILY
Qty: 30 TABLET | Refills: 0 | Status: ON HOLD | OUTPATIENT
Start: 2024-05-21 | End: 2024-05-31

## 2024-05-21 ASSESSMENT — ENCOUNTER SYMPTOMS
SHORTNESS OF BREATH: 0
ALLERGIC/IMMUNOLOGIC NEGATIVE: 1
DIARRHEA: 0
COUGH: 0
BLOOD IN STOOL: 0
EYES NEGATIVE: 1
WHEEZING: 0
RESPIRATORY NEGATIVE: 1
CONSTIPATION: 0

## 2024-05-21 ASSESSMENT — PATIENT HEALTH QUESTIONNAIRE - PHQ9
SUM OF ALL RESPONSES TO PHQ QUESTIONS 1-9: 0
SUM OF ALL RESPONSES TO PHQ QUESTIONS 1-9: 0
2. FEELING DOWN, DEPRESSED OR HOPELESS: NOT AT ALL
SUM OF ALL RESPONSES TO PHQ QUESTIONS 1-9: 0
SUM OF ALL RESPONSES TO PHQ QUESTIONS 1-9: 0

## 2024-05-21 NOTE — PROGRESS NOTES
\"Have you been to the ER, urgent care clinic since your last visit?  Hospitalized since your last visit?\"      Yes5/17/2024 [facility] ER fall  “Have you seen or consulted any other health care providers outside of [facility] since your last visit?”  no               Click Here for Release of Records Request      Chief Complaint   Patient presents with    Fall     Followup ER [facility] 5- with skin care to left lower arm         Vitals:    05/21/24 1116   BP: 122/72   Pulse: 55   Resp: 24   Temp: 97.6 °F (36.4 °C)   SpO2: 98%     
General: She is not in acute distress.     Appearance: Normal appearance.   HENT:      Head: Normocephalic and atraumatic.   Eyes:      Extraocular Movements: Extraocular movements intact.      Conjunctiva/sclera: Conjunctivae normal.      Pupils: Pupils are equal, round, and reactive to light.   Cardiovascular:      Rate and Rhythm: Normal rate and regular rhythm.      Pulses: Normal pulses.      Heart sounds: Normal heart sounds. No murmur heard.     No friction rub. No gallop.      Comments: Nonpitting edema to mid-thigh; lower legs with brawny edema and hemosiderin staining bilaterally  Pulmonary:      Effort: Pulmonary effort is normal. No respiratory distress.      Breath sounds: Normal breath sounds. No wheezing, rhonchi or rales.   Skin:     General: Skin is warm and dry.      Comments: Vesiculopapular rash on right medial deltoid extending to dorsum of right upper arm (C6 dermatome)   Neurological:      General: No focal deficit present.      Mental Status: She is alert and oriented to person, place, and time.   Psychiatric:         Mood and Affect: Mood normal.         Behavior: Behavior normal.         Thought Content: Thought content normal.         Judgment: Judgment normal.          ASSESSMENT AND PLAN:     1. Acute congestive heart failure, unspecified heart failure type (HCC)  -     furosemide (LASIX) 20 MG tablet; Take one tablet by mouth for the next 5 days; take one tablet by mouth as needed for weight gain >3 pounds in one day or >5 pounds in 1 week, Disp-30 tablet, R-0Normal  2. Falls frequently  -     External Referral To Home Health  3. Mild intermittent reactive airway disease without complication  -     albuterol sulfate HFA (PROVENTIL;VENTOLIN;PROAIR) 108 (90 Base) MCG/ACT inhaler; Inhale 2 puffs into the lungs every 4 hours as needed for Shortness of Breath, Disp-18 g, R-1Normal  4. Vesicular rash  -     valACYclovir (VALTREX) 1 g tablet; Take 1 tablet by mouth 3 times daily for 10 days,

## 2024-05-22 LAB
EKG ATRIAL RATE: 220 BPM
EKG DIAGNOSIS: NORMAL
EKG Q-T INTERVAL: 472 MS
EKG QRS DURATION: 130 MS
EKG QTC CALCULATION (BAZETT): 451 MS
EKG R AXIS: -62 DEGREES
EKG T AXIS: 110 DEGREES
EKG VENTRICULAR RATE: 55 BPM

## 2024-05-24 ENCOUNTER — TELEPHONE (OUTPATIENT)
Age: 89
End: 2024-05-24

## 2024-05-24 ENCOUNTER — HOSPITAL ENCOUNTER (INPATIENT)
Facility: HOSPITAL | Age: 89
LOS: 11 days | Discharge: NURSING, REHAB OR LONG-TERM CARE | DRG: 069 | End: 2024-06-04
Attending: EMERGENCY MEDICINE | Admitting: INTERNAL MEDICINE
Payer: MEDICARE

## 2024-05-24 ENCOUNTER — APPOINTMENT (OUTPATIENT)
Facility: HOSPITAL | Age: 89
DRG: 069 | End: 2024-05-24
Payer: MEDICARE

## 2024-05-24 DIAGNOSIS — I48.0 PAROXYSMAL ATRIAL FIBRILLATION (HCC): ICD-10-CM

## 2024-05-24 DIAGNOSIS — G45.9 TIA (TRANSIENT ISCHEMIC ATTACK): Primary | ICD-10-CM

## 2024-05-24 LAB
ALBUMIN SERPL-MCNC: 3.1 G/DL (ref 3.5–5)
ALBUMIN/GLOB SERPL: 1 (ref 1.1–2.2)
ALP SERPL-CCNC: 111 U/L (ref 45–117)
ALT SERPL-CCNC: 15 U/L (ref 12–78)
ANION GAP SERPL CALC-SCNC: 13 MMOL/L (ref 5–15)
APPEARANCE UR: ABNORMAL
AST SERPL-CCNC: 20 U/L (ref 15–37)
BACTERIA URNS QL MICRO: NEGATIVE /HPF
BASOPHILS # BLD: 0.1 K/UL (ref 0–0.1)
BASOPHILS NFR BLD: 1 % (ref 0–1)
BILIRUB SERPL-MCNC: 0.6 MG/DL (ref 0.2–1)
BILIRUB UR QL: NEGATIVE
BUN SERPL-MCNC: 33 MG/DL (ref 6–20)
BUN/CREAT SERPL: 17 (ref 12–20)
CALCIUM SERPL-MCNC: 8.3 MG/DL (ref 8.5–10.1)
CHLORIDE SERPL-SCNC: 107 MMOL/L (ref 97–108)
CO2 SERPL-SCNC: 21 MMOL/L (ref 21–32)
COLOR UR: ABNORMAL
CREAT SERPL-MCNC: 1.99 MG/DL (ref 0.55–1.02)
DIFFERENTIAL METHOD BLD: ABNORMAL
EOSINOPHIL # BLD: 0.4 K/UL (ref 0–0.4)
EOSINOPHIL NFR BLD: 6 % (ref 0–7)
EPITH CASTS URNS QL MICRO: ABNORMAL /LPF
ERYTHROCYTE [DISTWIDTH] IN BLOOD BY AUTOMATED COUNT: 15.1 % (ref 11.5–14.5)
EST. AVERAGE GLUCOSE BLD GHB EST-MCNC: 148 MG/DL
GLOBULIN SER CALC-MCNC: 3.2 G/DL (ref 2–4)
GLUCOSE BLD STRIP.AUTO-MCNC: 130 MG/DL (ref 65–117)
GLUCOSE BLD STRIP.AUTO-MCNC: 80 MG/DL (ref 65–117)
GLUCOSE BLD STRIP.AUTO-MCNC: 92 MG/DL (ref 65–117)
GLUCOSE SERPL-MCNC: 75 MG/DL (ref 65–100)
GLUCOSE UR STRIP.AUTO-MCNC: NEGATIVE MG/DL
HBA1C MFR BLD: 6.8 % (ref 4–5.6)
HCT VFR BLD AUTO: 36.1 % (ref 35–47)
HGB BLD-MCNC: 11.3 G/DL (ref 11.5–16)
HGB UR QL STRIP: ABNORMAL
IMM GRANULOCYTES # BLD AUTO: 0 K/UL (ref 0–0.04)
IMM GRANULOCYTES NFR BLD AUTO: 0 % (ref 0–0.5)
KETONES UR QL STRIP.AUTO: NEGATIVE MG/DL
LEUKOCYTE ESTERASE UR QL STRIP.AUTO: ABNORMAL
LYMPHOCYTES # BLD: 1.1 K/UL (ref 0.8–3.5)
LYMPHOCYTES NFR BLD: 18 % (ref 12–49)
MAGNESIUM SERPL-MCNC: 2.3 MG/DL (ref 1.6–2.4)
MCH RBC QN AUTO: 29.7 PG (ref 26–34)
MCHC RBC AUTO-ENTMCNC: 31.3 G/DL (ref 30–36.5)
MCV RBC AUTO: 94.8 FL (ref 80–99)
MONOCYTES # BLD: 0.8 K/UL (ref 0–1)
MONOCYTES NFR BLD: 12 % (ref 5–13)
MUCOUS THREADS URNS QL MICRO: ABNORMAL /LPF
NEUTS SEG # BLD: 4.1 K/UL (ref 1.8–8)
NEUTS SEG NFR BLD: 63 % (ref 32–75)
NITRITE UR QL STRIP.AUTO: NEGATIVE
NRBC # BLD: 0 K/UL (ref 0–0.01)
NRBC BLD-RTO: 0 PER 100 WBC
PH UR STRIP: 5.5 (ref 5–8)
PLATELET # BLD AUTO: 177 K/UL (ref 150–400)
PMV BLD AUTO: 11.8 FL (ref 8.9–12.9)
POTASSIUM SERPL-SCNC: 4.2 MMOL/L (ref 3.5–5.1)
PROT SERPL-MCNC: 6.3 G/DL (ref 6.4–8.2)
PROT UR STRIP-MCNC: NEGATIVE MG/DL
RBC # BLD AUTO: 3.81 M/UL (ref 3.8–5.2)
RBC #/AREA URNS HPF: ABNORMAL /HPF (ref 0–5)
SERVICE CMNT-IMP: ABNORMAL
SERVICE CMNT-IMP: NORMAL
SERVICE CMNT-IMP: NORMAL
SODIUM SERPL-SCNC: 141 MMOL/L (ref 136–145)
SP GR UR REFRACTOMETRY: 1.01 (ref 1–1.03)
TROPONIN I SERPL HS-MCNC: 11 NG/L (ref 0–51)
UROBILINOGEN UR QL STRIP.AUTO: 0.2 EU/DL (ref 0.2–1)
WBC # BLD AUTO: 6.5 K/UL (ref 3.6–11)
WBC URNS QL MICRO: ABNORMAL /HPF (ref 0–4)

## 2024-05-24 PROCEDURE — 83036 HEMOGLOBIN GLYCOSYLATED A1C: CPT

## 2024-05-24 PROCEDURE — 83735 ASSAY OF MAGNESIUM: CPT

## 2024-05-24 PROCEDURE — 1100000000 HC RM PRIVATE

## 2024-05-24 PROCEDURE — 80053 COMPREHEN METABOLIC PANEL: CPT

## 2024-05-24 PROCEDURE — G0378 HOSPITAL OBSERVATION PER HR: HCPCS

## 2024-05-24 PROCEDURE — 36415 COLL VENOUS BLD VENIPUNCTURE: CPT

## 2024-05-24 PROCEDURE — 93005 ELECTROCARDIOGRAM TRACING: CPT | Performed by: INTERNAL MEDICINE

## 2024-05-24 PROCEDURE — 84484 ASSAY OF TROPONIN QUANT: CPT

## 2024-05-24 PROCEDURE — 81001 URINALYSIS AUTO W/SCOPE: CPT

## 2024-05-24 PROCEDURE — 85025 COMPLETE CBC W/AUTO DIFF WBC: CPT

## 2024-05-24 PROCEDURE — 1100000003 HC PRIVATE W/ TELEMETRY

## 2024-05-24 PROCEDURE — 36600 WITHDRAWAL OF ARTERIAL BLOOD: CPT

## 2024-05-24 PROCEDURE — 99285 EMERGENCY DEPT VISIT HI MDM: CPT

## 2024-05-24 PROCEDURE — 6370000000 HC RX 637 (ALT 250 FOR IP): Performed by: INTERNAL MEDICINE

## 2024-05-24 PROCEDURE — 70450 CT HEAD/BRAIN W/O DYE: CPT

## 2024-05-24 PROCEDURE — 2580000003 HC RX 258: Performed by: INTERNAL MEDICINE

## 2024-05-24 PROCEDURE — 71045 X-RAY EXAM CHEST 1 VIEW: CPT

## 2024-05-24 PROCEDURE — 82962 GLUCOSE BLOOD TEST: CPT

## 2024-05-24 RX ORDER — ONDANSETRON 4 MG/1
4 TABLET, ORALLY DISINTEGRATING ORAL EVERY 8 HOURS PRN
Status: DISCONTINUED | OUTPATIENT
Start: 2024-05-24 | End: 2024-06-03

## 2024-05-24 RX ORDER — ATORVASTATIN CALCIUM 10 MG/1
10 TABLET, FILM COATED ORAL DAILY
Status: DISCONTINUED | OUTPATIENT
Start: 2024-05-25 | End: 2024-06-03

## 2024-05-24 RX ORDER — POLYETHYLENE GLYCOL 3350 17 G/17G
17 POWDER, FOR SOLUTION ORAL DAILY PRN
Status: DISCONTINUED | OUTPATIENT
Start: 2024-05-24 | End: 2024-06-04 | Stop reason: HOSPADM

## 2024-05-24 RX ORDER — ENOXAPARIN SODIUM 100 MG/ML
30 INJECTION SUBCUTANEOUS DAILY
Status: DISCONTINUED | OUTPATIENT
Start: 2024-05-25 | End: 2024-06-03

## 2024-05-24 RX ORDER — ASPIRIN 81 MG/1
81 TABLET ORAL DAILY
Status: DISCONTINUED | OUTPATIENT
Start: 2024-05-25 | End: 2024-06-04 | Stop reason: HOSPADM

## 2024-05-24 RX ORDER — DILTIAZEM HYDROCHLORIDE 120 MG/1
120 CAPSULE, EXTENDED RELEASE ORAL DAILY
Status: DISCONTINUED | OUTPATIENT
Start: 2024-05-24 | End: 2024-05-24 | Stop reason: CLARIF

## 2024-05-24 RX ORDER — INSULIN LISPRO 100 [IU]/ML
0-4 INJECTION, SOLUTION INTRAVENOUS; SUBCUTANEOUS
Status: DISCONTINUED | OUTPATIENT
Start: 2024-05-25 | End: 2024-05-31

## 2024-05-24 RX ORDER — INSULIN LISPRO 100 [IU]/ML
0-4 INJECTION, SOLUTION INTRAVENOUS; SUBCUTANEOUS NIGHTLY
Status: DISCONTINUED | OUTPATIENT
Start: 2024-05-24 | End: 2024-05-31

## 2024-05-24 RX ORDER — DILTIAZEM HYDROCHLORIDE 120 MG/1
120 CAPSULE, COATED, EXTENDED RELEASE ORAL DAILY
Status: DISCONTINUED | OUTPATIENT
Start: 2024-05-25 | End: 2024-06-04 | Stop reason: HOSPADM

## 2024-05-24 RX ORDER — FUROSEMIDE 40 MG/1
40 TABLET ORAL DAILY
Status: DISCONTINUED | OUTPATIENT
Start: 2024-05-25 | End: 2024-06-04 | Stop reason: HOSPADM

## 2024-05-24 RX ORDER — GLUCAGON 1 MG/ML
1 KIT INJECTION PRN
Status: DISCONTINUED | OUTPATIENT
Start: 2024-05-24 | End: 2024-06-03

## 2024-05-24 RX ORDER — SODIUM CHLORIDE 9 MG/ML
INJECTION, SOLUTION INTRAVENOUS PRN
Status: DISCONTINUED | OUTPATIENT
Start: 2024-05-24 | End: 2024-06-03

## 2024-05-24 RX ORDER — VALACYCLOVIR HYDROCHLORIDE 500 MG/1
500 TABLET, FILM COATED ORAL EVERY 12 HOURS SCHEDULED
Status: DISCONTINUED | OUTPATIENT
Start: 2024-05-24 | End: 2024-05-25

## 2024-05-24 RX ORDER — METOPROLOL SUCCINATE 25 MG/1
25 TABLET, EXTENDED RELEASE ORAL DAILY
Status: DISCONTINUED | OUTPATIENT
Start: 2024-05-25 | End: 2024-05-25

## 2024-05-24 RX ORDER — DEXTROSE MONOHYDRATE 100 MG/ML
INJECTION, SOLUTION INTRAVENOUS CONTINUOUS PRN
Status: DISCONTINUED | OUTPATIENT
Start: 2024-05-24 | End: 2024-06-03

## 2024-05-24 RX ORDER — ENOXAPARIN SODIUM 100 MG/ML
30 INJECTION SUBCUTANEOUS DAILY
Status: DISCONTINUED | OUTPATIENT
Start: 2024-05-24 | End: 2024-05-24 | Stop reason: SDUPTHER

## 2024-05-24 RX ORDER — SODIUM CHLORIDE 0.9 % (FLUSH) 0.9 %
5-40 SYRINGE (ML) INJECTION PRN
Status: DISCONTINUED | OUTPATIENT
Start: 2024-05-24 | End: 2024-06-03

## 2024-05-24 RX ORDER — SODIUM CHLORIDE 0.9 % (FLUSH) 0.9 %
5-40 SYRINGE (ML) INJECTION EVERY 12 HOURS SCHEDULED
Status: DISCONTINUED | OUTPATIENT
Start: 2024-05-24 | End: 2024-06-03

## 2024-05-24 RX ORDER — ONDANSETRON 2 MG/ML
4 INJECTION INTRAMUSCULAR; INTRAVENOUS EVERY 6 HOURS PRN
Status: DISCONTINUED | OUTPATIENT
Start: 2024-05-24 | End: 2024-06-03

## 2024-05-24 RX ORDER — CETIRIZINE HYDROCHLORIDE 10 MG/1
5 TABLET ORAL DAILY
Status: DISCONTINUED | OUTPATIENT
Start: 2024-05-25 | End: 2024-06-04 | Stop reason: HOSPADM

## 2024-05-24 RX ADMIN — SODIUM CHLORIDE, PRESERVATIVE FREE 10 ML: 5 INJECTION INTRAVENOUS at 21:41

## 2024-05-24 RX ADMIN — VALACYCLOVIR HYDROCHLORIDE 500 MG: 500 TABLET, FILM COATED ORAL at 21:39

## 2024-05-24 ASSESSMENT — PAIN SCALES - GENERAL
PAINLEVEL_OUTOF10: 0

## 2024-05-24 ASSESSMENT — PAIN - FUNCTIONAL ASSESSMENT: PAIN_FUNCTIONAL_ASSESSMENT: NONE - DENIES PAIN

## 2024-05-24 ASSESSMENT — LIFESTYLE VARIABLES
HOW MANY STANDARD DRINKS CONTAINING ALCOHOL DO YOU HAVE ON A TYPICAL DAY: PATIENT DOES NOT DRINK
HOW OFTEN DO YOU HAVE A DRINK CONTAINING ALCOHOL: NEVER

## 2024-05-24 NOTE — ED NOTES
Pt has returned from CT at this time. This RN to draw lab work. Phlebotomy not available at this time due to other obligations.

## 2024-05-24 NOTE — ED NOTES
Further IV attempts for labs by ED staff x 3 unsuccessful, Arterial sticks with RT Whit at bedside x 4 to obtain blood with difficulty. ED Provider aware of delay.    Pt provided with snacks by daughter at bedside as she has not had her usual meal at this time, cleared by ED Provider. Pt is a diabetic.

## 2024-05-24 NOTE — ED NOTES
Pt daughter at bedside, has provided pt with another of her special diet snack bars due to pt's irritable bowel concerns.

## 2024-05-24 NOTE — TELEPHONE ENCOUNTER
Pt daughter called stating that pt was not doing well. Stated that she did not know if she had a stroke or not. Wanted to know if she should call ambulance. Told her yes, she needed to do that. She wanted to let you know what was going on.

## 2024-05-24 NOTE — ED TRIAGE NOTES
Pt arrives EMS with c/o slurred speech that began yesterday. It resolved last night, but family was concerned that upon waking today pt seemed to have reoccurence of slurred speech, with some left sided facial droop, as well as not being easy to awaken. Pt is A/Ox4 on arrival. EMS reports that there was facial droop en route that has since resolved.

## 2024-05-24 NOTE — ED PROVIDER NOTES
[facility] MED/SURG  EMERGENCY DEPARTMENT ENCOUNTER      Patient Name: Elias Bhandari  MRN: [id number]  Birthdate 6/3/1929  Date of Evaluation: 5/24/2024  Physician: Bal Landry MD    CHIEF COMPLAINT       Chief Complaint   Patient presents with    Aphasia       HISTORY OF PRESENT ILLNESS   (Location/Symptom, Timing/Onset, Context/Setting, Quality, Duration, Modifying Factors, Severity)   Elias Bhandari, 89 y.o., female     89-year-old female presents with concern for stroke.  Patient reportedly had some slurred speech yesterday.  It is not clear what her last known normal was.  Family noted her to have some slurred speech and left-sided facial droop today.  EMS reports that her symptoms have resolved since they saw her on scene.  Patient herself endorses left lower extremity weakness which she states has been ongoing for over a week.          Nursing Notes were reviewed.    REVIEW OF SYSTEMS    (Not required)   Review of Systems    Except as noted above the remainder of the review of systems was reviewed and negative.     PAST MEDICAL HISTORY     Past Medical History:   Diagnosis Date    Abnormal echocardiogram 2016    New A-fib    Asthma     Bilateral lower leg cellulitis 2017    Bleeding hemorrhoids 2017    Candida rash of groin 2021    CKD (chronic kidney disease) stage 3, GFR 30-59 ml/min ([facility]) 2017    COPD (chronic obstructive pulmonary disease) ([facility])     HTN (hypertension) 2015    Pulmonary hypertension ([facility])     Renal mass 2019    Type 2 diabetes mellitus ([facility]) 2019    Venous stasis dermatitis 2017       SURGICAL HISTORY     History reviewed. No pertinent surgical history.    CURRENT MEDICATIONS       Current Discharge Medication List        CONTINUE these medications which have NOT CHANGED    Details   valACYclovir (VALTREX) 1 g tablet Take 1 tablet by mouth 3 times daily for 10 days  Qty: 30 tablet, Refills: 0    Associated Diagnoses: Vesicular rash      albuterol sulfate HFA (PROVENTIL;VENTOLIN;PROAIR) 108 (90

## 2024-05-24 NOTE — H&P
[facility]   Admission History & Physical        5/24/2024 7:21 PM  Patient: Elias Bhandari 6/3/1929  PCP: Gracy Sr APRN - NP    HISTORY  Chief Complaint:   Chief Complaint   Patient presents with    Aphasia       HPI: 89 y.o. female presenting for admission to [facility] for further evaluation and treatment for TIA (transient ischemic attack).  She  has a past medical history of Abnormal echocardiogram, Asthma, Bilateral lower leg cellulitis, Bleeding hemorrhoids, Candida rash of groin, CKD (chronic kidney disease) stage 3, GFR 30-59 ml/min (HCC), COPD (chronic obstructive pulmonary disease) ([facility]), HTN (hypertension), Pulmonary hypertension (HCC), Renal mass, Type 2 diabetes mellitus (HCC), and Venous stasis dermatitis..     Pt presents to ED with c/o facial droop today, and slurred speech yesterday.  Has h/o paroxysmal Afib off Warfarin due to h/o falls and GI bleed in the past.  Lives with daughter.  Family concerned for potential stroke.  They noted slurring of speech yesterday.  Today the family noted some speech slurring and L sided facial droop.  EMS appreciated the facial droop on arrival, but this resolved during transport.  Pt has h/o lifelong L LE weakness a/w possible Polio or Cerebral Palsy following difficult child birth.     Pt was seen in the ED on 5/17 with h/o falls.  Pt suddenly felt the legs were heavy.  She fell unwitnessed getting up from bed.  They reported 3 falls over the past 10days.  Recent progression of LE swelling.  She has been compliant with Lasix 40mg daily.  ED evaluation 5/17 was noted for wheezing and LE edema and L forearm skin tears.  Labs were noted for low BS.  Lasix was advanced from 20mg to 40mg daily.      DNR is clarified with her daughter.  Durable DNR signed 2/28/23.  Clarified verbally again today       Past Medical History:  Past Medical History:   Diagnosis Date    Abnormal echocardiogram 2016    New A-fib    Asthma     Bilateral lower leg

## 2024-05-24 NOTE — ED NOTES
Admission SBAR Note    Situation/Background: pt brought in by EMS for episode of slurred speech yesterday that resolved, today small episode of left facial droop that was resolved before arrival. Pt has NIHSS of 1 unrelated to new complaints, states she has trouble lifting her left leg and this is not new. Pt has no symptoms at this time except some shingles on her right arm, dressed wound on her left arm from fall 1 week previous to ED visit. Compression garments on lower extremities. Pt reports she has irritable bowel syndrome and it affects her by making her unable to eat most foods. Pt daughter reports she can have apple sauce, joy crackers and breakfast cereals without milk.    Patient is being transferred to [facility] ([facility] Room# 124    Patient's Chief Complaint was Facial Droop and is admitted for TIA Work-up.    CODE STATUS: Full  CSSRS: 0 - No Risk    ISOLATION/PRECAUTIONS: Yes  ISOLATION TYPE: standard    Is this a behavioral health patient? No  Has wanding been completed No  Are belongings secure? No    Called outstanding consults: Yes    STAT labs collected: Yes    Repeat Lactic Acid DUE? No  TIME DUE: n/a    All STAT orders are complete: Yes    The following personal items will be sent with the patient during transfer to the floor:     All valuables: clothing with patient      ASSESSMENT:    NEURO:   NIH SCORE: 0,1-4,5-15,15-20,21-42: 1   MOUNA SWALLOW SCREEN COMPLETE: Yes  ORIENTATION LEVEL: ORIENTATION LEVEL: Person, Place, Time, and Situation  Cognition:  appropriate decision making, appropriate for age attention/concentration, appropriate safety awareness, and following commands  follows multi-step complex commands/direction  Speech: shows no evidence of impairment    Is patient impulsive? No  Is patient oriented? Yes  Do they follow commands? Yes  Is the patient ambulatory? Yes    FALL RISK? Yes  Interventions:

## 2024-05-24 NOTE — ED NOTES
2281 - 4953    This RN has attempted to draw labs through existing line, flushes with small hematoma, after rest does not continue to expand and very slow blood return, not enough to draw labs.    After this, Peripheral IV attempts x 3 to draw labs and place additional line unsuccessful. Additional assistance being sought, Charge RN aware.

## 2024-05-25 LAB
ANION GAP SERPL CALC-SCNC: 11 MMOL/L (ref 5–15)
BASOPHILS # BLD: 0.1 K/UL (ref 0–0.1)
BASOPHILS NFR BLD: 1 % (ref 0–1)
BUN SERPL-MCNC: 29 MG/DL (ref 6–20)
BUN/CREAT SERPL: 15 (ref 12–20)
CALCIUM SERPL-MCNC: 8.6 MG/DL (ref 8.5–10.1)
CHLORIDE SERPL-SCNC: 107 MMOL/L (ref 97–108)
CHOLEST SERPL-MCNC: 77 MG/DL
CO2 SERPL-SCNC: 25 MMOL/L (ref 21–32)
CREAT SERPL-MCNC: 1.91 MG/DL (ref 0.55–1.02)
DIFFERENTIAL METHOD BLD: ABNORMAL
EKG ATRIAL RATE: 241 BPM
EKG DIAGNOSIS: NORMAL
EKG Q-T INTERVAL: 476 MS
EKG QRS DURATION: 118 MS
EKG QTC CALCULATION (BAZETT): 502 MS
EKG R AXIS: -52 DEGREES
EKG T AXIS: -18 DEGREES
EKG VENTRICULAR RATE: 67 BPM
EOSINOPHIL # BLD: 0.4 K/UL (ref 0–0.4)
EOSINOPHIL NFR BLD: 5 % (ref 0–7)
ERYTHROCYTE [DISTWIDTH] IN BLOOD BY AUTOMATED COUNT: 15.1 % (ref 11.5–14.5)
EST. AVERAGE GLUCOSE BLD GHB EST-MCNC: 148 MG/DL
GLUCOSE BLD STRIP.AUTO-MCNC: 104 MG/DL (ref 65–117)
GLUCOSE BLD STRIP.AUTO-MCNC: 142 MG/DL (ref 65–117)
GLUCOSE BLD STRIP.AUTO-MCNC: 147 MG/DL (ref 65–117)
GLUCOSE BLD STRIP.AUTO-MCNC: 175 MG/DL (ref 65–117)
GLUCOSE SERPL-MCNC: 114 MG/DL (ref 65–100)
HBA1C MFR BLD: 6.8 % (ref 4–5.6)
HCT VFR BLD AUTO: 36.4 % (ref 35–47)
HDLC SERPL-MCNC: 50 MG/DL
HDLC SERPL: 1.5 (ref 0–5)
HGB BLD-MCNC: 11.5 G/DL (ref 11.5–16)
IMM GRANULOCYTES # BLD AUTO: 0 K/UL (ref 0–0.04)
IMM GRANULOCYTES NFR BLD AUTO: 0 % (ref 0–0.5)
LDLC SERPL CALC-MCNC: 16 MG/DL (ref 0–100)
LYMPHOCYTES # BLD: 1 K/UL (ref 0.8–3.5)
LYMPHOCYTES NFR BLD: 14 % (ref 12–49)
MCH RBC QN AUTO: 29.6 PG (ref 26–34)
MCHC RBC AUTO-ENTMCNC: 31.6 G/DL (ref 30–36.5)
MCV RBC AUTO: 93.6 FL (ref 80–99)
MONOCYTES # BLD: 0.8 K/UL (ref 0–1)
MONOCYTES NFR BLD: 10 % (ref 5–13)
NEUTS SEG # BLD: 5.3 K/UL (ref 1.8–8)
NEUTS SEG NFR BLD: 70 % (ref 32–75)
NRBC # BLD: 0 K/UL (ref 0–0.01)
NRBC BLD-RTO: 0 PER 100 WBC
PLATELET # BLD AUTO: 191 K/UL (ref 150–400)
PMV BLD AUTO: 11.3 FL (ref 8.9–12.9)
POTASSIUM SERPL-SCNC: 4 MMOL/L (ref 3.5–5.1)
RBC # BLD AUTO: 3.89 M/UL (ref 3.8–5.2)
SERVICE CMNT-IMP: ABNORMAL
SERVICE CMNT-IMP: NORMAL
SODIUM SERPL-SCNC: 143 MMOL/L (ref 136–145)
TRIGL SERPL-MCNC: 55 MG/DL
VLDLC SERPL CALC-MCNC: 11 MG/DL
WBC # BLD AUTO: 7.6 K/UL (ref 3.6–11)

## 2024-05-25 PROCEDURE — 85025 COMPLETE CBC W/AUTO DIFF WBC: CPT

## 2024-05-25 PROCEDURE — G0378 HOSPITAL OBSERVATION PER HR: HCPCS

## 2024-05-25 PROCEDURE — 80048 BASIC METABOLIC PNL TOTAL CA: CPT

## 2024-05-25 PROCEDURE — 6370000000 HC RX 637 (ALT 250 FOR IP): Performed by: INTERNAL MEDICINE

## 2024-05-25 PROCEDURE — 96372 THER/PROPH/DIAG INJ SC/IM: CPT

## 2024-05-25 PROCEDURE — 97161 PT EVAL LOW COMPLEX 20 MIN: CPT

## 2024-05-25 PROCEDURE — 97116 GAIT TRAINING THERAPY: CPT

## 2024-05-25 PROCEDURE — 82962 GLUCOSE BLOOD TEST: CPT

## 2024-05-25 PROCEDURE — 83036 HEMOGLOBIN GLYCOSYLATED A1C: CPT

## 2024-05-25 PROCEDURE — 80061 LIPID PANEL: CPT

## 2024-05-25 PROCEDURE — 1100000003 HC PRIVATE W/ TELEMETRY

## 2024-05-25 PROCEDURE — 94760 N-INVAS EAR/PLS OXIMETRY 1: CPT

## 2024-05-25 PROCEDURE — 6360000002 HC RX W HCPCS: Performed by: INTERNAL MEDICINE

## 2024-05-25 PROCEDURE — 2580000003 HC RX 258: Performed by: INTERNAL MEDICINE

## 2024-05-25 PROCEDURE — 36415 COLL VENOUS BLD VENIPUNCTURE: CPT

## 2024-05-25 RX ORDER — METOPROLOL SUCCINATE 50 MG/1
50 TABLET, EXTENDED RELEASE ORAL DAILY
Status: DISCONTINUED | OUTPATIENT
Start: 2024-05-26 | End: 2024-06-04 | Stop reason: HOSPADM

## 2024-05-25 RX ORDER — VALACYCLOVIR HYDROCHLORIDE 500 MG/1
1000 TABLET, FILM COATED ORAL DAILY
Status: COMPLETED | OUTPATIENT
Start: 2024-05-25 | End: 2024-06-01

## 2024-05-25 RX ORDER — IBUPROFEN 200 MG
400 TABLET ORAL EVERY 6 HOURS PRN
Status: DISCONTINUED | OUTPATIENT
Start: 2024-05-25 | End: 2024-05-25

## 2024-05-25 RX ORDER — IBUPROFEN 200 MG
400 TABLET ORAL 2 TIMES DAILY PRN
Status: DISCONTINUED | OUTPATIENT
Start: 2024-05-25 | End: 2024-05-25

## 2024-05-25 RX ORDER — HYDROCORTISONE 25 MG/G
CREAM TOPICAL 2 TIMES DAILY
Status: DISCONTINUED | OUTPATIENT
Start: 2024-05-25 | End: 2024-06-04 | Stop reason: HOSPADM

## 2024-05-25 RX ORDER — ACETAMINOPHEN 325 MG/1
650 TABLET ORAL 2 TIMES DAILY PRN
Status: DISCONTINUED | OUTPATIENT
Start: 2024-05-25 | End: 2024-06-03

## 2024-05-25 RX ADMIN — METOPROLOL SUCCINATE 25 MG: 25 TABLET, FILM COATED, EXTENDED RELEASE ORAL at 09:13

## 2024-05-25 RX ADMIN — FUROSEMIDE 40 MG: 40 TABLET ORAL at 09:15

## 2024-05-25 RX ADMIN — ATORVASTATIN CALCIUM 10 MG: 10 TABLET, FILM COATED ORAL at 09:16

## 2024-05-25 RX ADMIN — DILTIAZEM HYDROCHLORIDE 120 MG: 120 CAPSULE, EXTENDED RELEASE ORAL at 09:14

## 2024-05-25 RX ADMIN — VALACYCLOVIR HYDROCHLORIDE 1000 MG: 500 TABLET, FILM COATED ORAL at 10:06

## 2024-05-25 RX ADMIN — ENOXAPARIN SODIUM 30 MG: 100 INJECTION SUBCUTANEOUS at 09:13

## 2024-05-25 RX ADMIN — SODIUM CHLORIDE, PRESERVATIVE FREE 10 ML: 5 INJECTION INTRAVENOUS at 09:17

## 2024-05-25 RX ADMIN — CETIRIZINE HYDROCHLORIDE 5 MG: 10 TABLET, FILM COATED ORAL at 09:13

## 2024-05-25 RX ADMIN — ASPIRIN 81 MG: 81 TABLET, COATED ORAL at 09:14

## 2024-05-25 ASSESSMENT — PAIN SCALES - GENERAL
PAINLEVEL_OUTOF10: 0

## 2024-05-26 LAB
GLUCOSE BLD STRIP.AUTO-MCNC: 115 MG/DL (ref 65–117)
GLUCOSE BLD STRIP.AUTO-MCNC: 134 MG/DL (ref 65–117)
GLUCOSE BLD STRIP.AUTO-MCNC: 229 MG/DL (ref 65–117)
SERVICE CMNT-IMP: ABNORMAL
SERVICE CMNT-IMP: ABNORMAL
SERVICE CMNT-IMP: NORMAL

## 2024-05-26 PROCEDURE — 6370000000 HC RX 637 (ALT 250 FOR IP): Performed by: INTERNAL MEDICINE

## 2024-05-26 PROCEDURE — 96372 THER/PROPH/DIAG INJ SC/IM: CPT

## 2024-05-26 PROCEDURE — 1100000003 HC PRIVATE W/ TELEMETRY

## 2024-05-26 PROCEDURE — 2580000003 HC RX 258: Performed by: INTERNAL MEDICINE

## 2024-05-26 PROCEDURE — 2500000003 HC RX 250 WO HCPCS: Performed by: INTERNAL MEDICINE

## 2024-05-26 PROCEDURE — 82962 GLUCOSE BLOOD TEST: CPT

## 2024-05-26 PROCEDURE — G0378 HOSPITAL OBSERVATION PER HR: HCPCS

## 2024-05-26 PROCEDURE — 6360000002 HC RX W HCPCS: Performed by: INTERNAL MEDICINE

## 2024-05-26 RX ADMIN — MICONAZOLE NITRATE: 20 POWDER TOPICAL at 21:05

## 2024-05-26 RX ADMIN — METOPROLOL SUCCINATE 50 MG: 50 TABLET, EXTENDED RELEASE ORAL at 09:43

## 2024-05-26 RX ADMIN — SODIUM CHLORIDE, PRESERVATIVE FREE 10 ML: 5 INJECTION INTRAVENOUS at 10:18

## 2024-05-26 RX ADMIN — VALACYCLOVIR HYDROCHLORIDE 1000 MG: 500 TABLET, FILM COATED ORAL at 09:41

## 2024-05-26 RX ADMIN — DILTIAZEM HYDROCHLORIDE 120 MG: 120 CAPSULE, EXTENDED RELEASE ORAL at 09:42

## 2024-05-26 RX ADMIN — ASPIRIN 81 MG: 81 TABLET, COATED ORAL at 09:42

## 2024-05-26 RX ADMIN — HYDROCORTISONE: 25 CREAM TOPICAL at 20:54

## 2024-05-26 RX ADMIN — FUROSEMIDE 40 MG: 40 TABLET ORAL at 09:42

## 2024-05-26 RX ADMIN — INSULIN LISPRO 1 UNITS: 100 INJECTION, SOLUTION INTRAVENOUS; SUBCUTANEOUS at 12:22

## 2024-05-26 RX ADMIN — MICONAZOLE NITRATE: 20 POWDER TOPICAL at 10:18

## 2024-05-26 RX ADMIN — ATORVASTATIN CALCIUM 10 MG: 10 TABLET, FILM COATED ORAL at 09:42

## 2024-05-26 RX ADMIN — SODIUM CHLORIDE, PRESERVATIVE FREE 10 ML: 5 INJECTION INTRAVENOUS at 21:06

## 2024-05-26 RX ADMIN — CETIRIZINE HYDROCHLORIDE 5 MG: 10 TABLET, FILM COATED ORAL at 09:42

## 2024-05-26 RX ADMIN — HYDROCORTISONE: 25 CREAM TOPICAL at 09:45

## 2024-05-26 RX ADMIN — ENOXAPARIN SODIUM 30 MG: 100 INJECTION SUBCUTANEOUS at 09:41

## 2024-05-26 ASSESSMENT — PAIN DESCRIPTION - PAIN TYPE: TYPE: ACUTE PAIN

## 2024-05-26 ASSESSMENT — PAIN SCALES - GENERAL: PAINLEVEL_OUTOF10: 2

## 2024-05-26 ASSESSMENT — PAIN DESCRIPTION - LOCATION: LOCATION: ARM

## 2024-05-26 ASSESSMENT — PAIN DESCRIPTION - ORIENTATION: ORIENTATION: LEFT

## 2024-05-27 LAB
GLUCOSE BLD STRIP.AUTO-MCNC: 127 MG/DL (ref 65–117)
GLUCOSE BLD STRIP.AUTO-MCNC: 139 MG/DL (ref 65–117)
GLUCOSE BLD STRIP.AUTO-MCNC: 148 MG/DL (ref 65–117)
GLUCOSE BLD STRIP.AUTO-MCNC: 172 MG/DL (ref 65–117)
SERVICE CMNT-IMP: ABNORMAL

## 2024-05-27 PROCEDURE — G0378 HOSPITAL OBSERVATION PER HR: HCPCS

## 2024-05-27 PROCEDURE — 1100000003 HC PRIVATE W/ TELEMETRY

## 2024-05-27 PROCEDURE — 82962 GLUCOSE BLOOD TEST: CPT

## 2024-05-27 PROCEDURE — 94760 N-INVAS EAR/PLS OXIMETRY 1: CPT

## 2024-05-27 PROCEDURE — 96372 THER/PROPH/DIAG INJ SC/IM: CPT

## 2024-05-27 PROCEDURE — 6370000000 HC RX 637 (ALT 250 FOR IP): Performed by: INTERNAL MEDICINE

## 2024-05-27 PROCEDURE — 97165 OT EVAL LOW COMPLEX 30 MIN: CPT

## 2024-05-27 PROCEDURE — 97535 SELF CARE MNGMENT TRAINING: CPT

## 2024-05-27 PROCEDURE — 6360000002 HC RX W HCPCS: Performed by: INTERNAL MEDICINE

## 2024-05-27 RX ADMIN — DILTIAZEM HYDROCHLORIDE 120 MG: 120 CAPSULE, EXTENDED RELEASE ORAL at 09:23

## 2024-05-27 RX ADMIN — FUROSEMIDE 40 MG: 40 TABLET ORAL at 09:23

## 2024-05-27 RX ADMIN — ASPIRIN 81 MG: 81 TABLET, COATED ORAL at 09:23

## 2024-05-27 RX ADMIN — METOPROLOL SUCCINATE 50 MG: 50 TABLET, EXTENDED RELEASE ORAL at 09:23

## 2024-05-27 RX ADMIN — VALACYCLOVIR HYDROCHLORIDE 1000 MG: 500 TABLET, FILM COATED ORAL at 09:23

## 2024-05-27 RX ADMIN — ATORVASTATIN CALCIUM 10 MG: 10 TABLET, FILM COATED ORAL at 10:00

## 2024-05-27 RX ADMIN — MICONAZOLE NITRATE: 20 POWDER TOPICAL at 17:28

## 2024-05-27 RX ADMIN — ENOXAPARIN SODIUM 30 MG: 100 INJECTION SUBCUTANEOUS at 09:22

## 2024-05-27 RX ADMIN — CETIRIZINE HYDROCHLORIDE 5 MG: 10 TABLET, FILM COATED ORAL at 09:23

## 2024-05-27 ASSESSMENT — PAIN SCALES - GENERAL
PAINLEVEL_OUTOF10: 0

## 2024-05-27 ASSESSMENT — PAIN DESCRIPTION - LOCATION
LOCATION: GENERALIZED
LOCATION: GENERALIZED

## 2024-05-27 NOTE — ACP (ADVANCE CARE PLANNING)
Advance Care Planning     General Advance Care Planning (ACP) Conversation    Date of Conversation: 5/27/2024  Conducted with: Patient with Decision Making Capacity and Healthcare Decision Maker  Other persons present: None    Healthcare Decision Maker:   Primary Decision Maker: Bettie Manuel - Child - [phone]  Click here to complete Healthcare Decision Makers including selection of the Healthcare Decision Maker Relationship (ie \"Primary\").     Content/Action Overview:  Has ACP document(s) on file - reflects the patient's care preferences  Reviewed DNR/DNI and patient confirms current DNR status - completed forms on file (place new order if needed)      Length of Voluntary ACP Conversation in minutes:  <16 minutes (Non-Billable)    ALBINO Sanchez

## 2024-05-27 NOTE — CARE COORDINATION
Care Management Initial Assessment       RUR: 15%  Readmission? No  1st IM letter given? Yes - 24 Patient Access  1st  letter given: No N/A      24 1325   Service Assessment   Patient Orientation Alert and Oriented   Cognition Alert   History Provided By Child/Family   Primary Caregiver Family   Support Systems Children   PCP Verified by CM Yes  (Gracy Sr NP)   Last Visit to PCP Within last 3 months   Prior Functional Level Assistance with the following:;Bathing;Dressing;Toileting;Cooking;Housework;Shopping;Mobility   Current Functional Level Assistance with the following:;Bathing;Dressing;Toileting;Housework;Cooking;Shopping;Mobility   Can patient return to prior living arrangement Yes   Family able to assist with home care needs: Yes   Would you like for me to discuss the discharge plan with any other family members/significant others, and if so, who? Yes  (Daughter Bettie Manuel)   Financial Resources Medicare   Community Resources None   Social/Functional History   Lives With Daughter   Type of Home House   Home Equipment Walker - Rolling;Wheelchair - Manual   Active  No   Discharge Planning   Type of Residence Other (Comment)  (TBD)   Current Services Prior To Admission None   Patient expects to be discharged to: Other (comment)  (TBD)     Ms. Bhandari was admitted under Inpatient status 24 with TIA. Daughter Bettie Manuel is her primary caregiver and the designated primary decision maker on the Advance Directive which is on file.  I telephoned Ms. Manuel for the CM intake assessment, [phone]. Ms. Bhandari lives with Ms. Manuel and son in law in a house in [place].   Ms. Bhandari is requiring ADL assistance which daughter provides in the home. Her PCP recently ordered home health through [facility].  Ms. Bhandari is enrolled in a Medicare plan with [employer]. She receives Social Security and receives a 's Affairs benefit; this is a 's pension as her  spouse

## 2024-05-28 ENCOUNTER — APPOINTMENT (OUTPATIENT)
Facility: HOSPITAL | Age: 89
DRG: 069 | End: 2024-05-28
Payer: MEDICARE

## 2024-05-28 DIAGNOSIS — I50.9 ACUTE CONGESTIVE HEART FAILURE, UNSPECIFIED HEART FAILURE TYPE (HCC): ICD-10-CM

## 2024-05-28 LAB
ANION GAP SERPL CALC-SCNC: 9 MMOL/L (ref 5–15)
BASOPHILS # BLD: 0.1 K/UL (ref 0–0.1)
BASOPHILS NFR BLD: 1 % (ref 0–1)
BUN SERPL-MCNC: 28 MG/DL (ref 6–20)
BUN/CREAT SERPL: 18 (ref 12–20)
CALCIUM SERPL-MCNC: 8.1 MG/DL (ref 8.5–10.1)
CHLORIDE SERPL-SCNC: 106 MMOL/L (ref 97–108)
CO2 SERPL-SCNC: 27 MMOL/L (ref 21–32)
CREAT SERPL-MCNC: 1.55 MG/DL (ref 0.55–1.02)
DIFFERENTIAL METHOD BLD: ABNORMAL
ECHO AO ASC DIAM: 3.1 CM
ECHO AO ASCENDING AORTA INDEX: 1.68 CM/M2
ECHO AO ROOT DIAM: 3 CM
ECHO AO ROOT INDEX: 1.62 CM/M2
ECHO AR MAX VEL PISA: 3.3 M/S
ECHO AV AREA PEAK VELOCITY: 1.3 CM2
ECHO AV AREA VTI: 0.9 CM2
ECHO AV AREA/BSA PEAK VELOCITY: 0.7 CM2/M2
ECHO AV AREA/BSA VTI: 0.5 CM2/M2
ECHO AV MEAN GRADIENT: 21 MMHG
ECHO AV MEAN VELOCITY: 2.1 M/S
ECHO AV PEAK GRADIENT: 32 MMHG
ECHO AV PEAK VELOCITY: 2.8 M/S
ECHO AV REGURGITANT PHT: 443.7 MILLISECOND
ECHO AV VELOCITY RATIO: 0.46
ECHO AV VTI: 84.2 CM
ECHO BSA: 1.93 M2
ECHO EST RA PRESSURE: 3 MMHG
ECHO LA DIAMETER INDEX: 2.54 CM/M2
ECHO LA DIAMETER: 4.7 CM
ECHO LA TO AORTIC ROOT RATIO: 1.57
ECHO LA VOL A-L A2C: 70 ML (ref 22–52)
ECHO LA VOL A-L A4C: 115 ML (ref 22–52)
ECHO LA VOL MOD A2C: 67 ML (ref 22–52)
ECHO LA VOL MOD A4C: 108 ML (ref 22–52)
ECHO LA VOLUME AREA LENGTH: 103 ML
ECHO LA VOLUME INDEX A-L A2C: 38 ML/M2 (ref 16–34)
ECHO LA VOLUME INDEX A-L A4C: 62 ML/M2 (ref 16–34)
ECHO LA VOLUME INDEX AREA LENGTH: 56 ML/M2 (ref 16–34)
ECHO LA VOLUME INDEX MOD A2C: 36 ML/M2 (ref 16–34)
ECHO LA VOLUME INDEX MOD A4C: 58 ML/M2 (ref 16–34)
ECHO LV E' LATERAL VELOCITY: 8 CM/S
ECHO LV E' SEPTAL VELOCITY: 7 CM/S
ECHO LV FRACTIONAL SHORTENING: 35 % (ref 28–44)
ECHO LV INTERNAL DIMENSION DIASTOLE INDEX: 2.49 CM/M2
ECHO LV INTERNAL DIMENSION DIASTOLIC: 4.6 CM (ref 3.9–5.3)
ECHO LV INTERNAL DIMENSION SYSTOLIC INDEX: 1.62 CM/M2
ECHO LV INTERNAL DIMENSION SYSTOLIC: 3 CM
ECHO LV IVSD: 0.9 CM (ref 0.6–0.9)
ECHO LV MASS 2D: 137.7 G (ref 67–162)
ECHO LV MASS INDEX 2D: 74.4 G/M2 (ref 43–95)
ECHO LV POSTERIOR WALL DIASTOLIC: 0.9 CM (ref 0.6–0.9)
ECHO LV RELATIVE WALL THICKNESS RATIO: 0.39
ECHO LVOT AREA: 2.8 CM2
ECHO LVOT AV VTI INDEX: 0.34
ECHO LVOT DIAM: 1.9 CM
ECHO LVOT MEAN GRADIENT: 4 MMHG
ECHO LVOT PEAK GRADIENT: 7 MMHG
ECHO LVOT PEAK VELOCITY: 1.3 M/S
ECHO LVOT STROKE VOLUME INDEX: 43.8 ML/M2
ECHO LVOT SV: 81 ML
ECHO LVOT VTI: 28.6 CM
ECHO MV A VELOCITY: 0.69 M/S
ECHO MV E DECELERATION TIME (DT): 141.5 MS
ECHO MV E VELOCITY: 1.3 M/S
ECHO MV E/A RATIO: 1.88
ECHO MV E/E' LATERAL: 16.25
ECHO MV E/E' RATIO (AVERAGED): 17.41
ECHO MV E/E' SEPTAL: 18.57
ECHO PULMONARY ARTERY SYSTOLIC PRESSURE (PASP): 41 MMHG
ECHO RA AREA 4C: 32.2 CM2
ECHO RIGHT VENTRICULAR SYSTOLIC PRESSURE (RVSP): 41 MMHG
ECHO RV BASAL DIMENSION: 5.3 CM
ECHO RV TAPSE: 1.2 CM (ref 1.7–?)
ECHO TV REGURGITANT MAX VELOCITY: 3.08 M/S
ECHO TV REGURGITANT PEAK GRADIENT: 38 MMHG
EOSINOPHIL # BLD: 0.2 K/UL (ref 0–0.4)
EOSINOPHIL NFR BLD: 3 % (ref 0–7)
ERYTHROCYTE [DISTWIDTH] IN BLOOD BY AUTOMATED COUNT: 14.8 % (ref 11.5–14.5)
GLUCOSE BLD STRIP.AUTO-MCNC: 107 MG/DL (ref 65–117)
GLUCOSE BLD STRIP.AUTO-MCNC: 157 MG/DL (ref 65–117)
GLUCOSE BLD STRIP.AUTO-MCNC: 187 MG/DL (ref 65–117)
GLUCOSE BLD STRIP.AUTO-MCNC: 238 MG/DL (ref 65–117)
GLUCOSE SERPL-MCNC: 160 MG/DL (ref 65–100)
HCT VFR BLD AUTO: 33.5 % (ref 35–47)
HGB BLD-MCNC: 10.7 G/DL (ref 11.5–16)
IMM GRANULOCYTES # BLD AUTO: 0 K/UL (ref 0–0.04)
IMM GRANULOCYTES NFR BLD AUTO: 0 % (ref 0–0.5)
LYMPHOCYTES # BLD: 1 K/UL (ref 0.8–3.5)
LYMPHOCYTES NFR BLD: 17 % (ref 12–49)
MCH RBC QN AUTO: 29.6 PG (ref 26–34)
MCHC RBC AUTO-ENTMCNC: 31.9 G/DL (ref 30–36.5)
MCV RBC AUTO: 92.8 FL (ref 80–99)
MONOCYTES # BLD: 1 K/UL (ref 0–1)
MONOCYTES NFR BLD: 16 % (ref 5–13)
NEUTS SEG # BLD: 3.8 K/UL (ref 1.8–8)
NEUTS SEG NFR BLD: 63 % (ref 32–75)
NRBC # BLD: 0 K/UL (ref 0–0.01)
NRBC BLD-RTO: 0 PER 100 WBC
PLATELET # BLD AUTO: 158 K/UL (ref 150–400)
PMV BLD AUTO: 11.7 FL (ref 8.9–12.9)
POTASSIUM SERPL-SCNC: 3.3 MMOL/L (ref 3.5–5.1)
RBC # BLD AUTO: 3.61 M/UL (ref 3.8–5.2)
SERVICE CMNT-IMP: ABNORMAL
SERVICE CMNT-IMP: NORMAL
SODIUM SERPL-SCNC: 142 MMOL/L (ref 136–145)
WBC # BLD AUTO: 6.1 K/UL (ref 3.6–11)

## 2024-05-28 PROCEDURE — 6360000002 HC RX W HCPCS: Performed by: INTERNAL MEDICINE

## 2024-05-28 PROCEDURE — 97535 SELF CARE MNGMENT TRAINING: CPT

## 2024-05-28 PROCEDURE — 94760 N-INVAS EAR/PLS OXIMETRY 1: CPT

## 2024-05-28 PROCEDURE — 96372 THER/PROPH/DIAG INJ SC/IM: CPT

## 2024-05-28 PROCEDURE — 82962 GLUCOSE BLOOD TEST: CPT

## 2024-05-28 PROCEDURE — G0378 HOSPITAL OBSERVATION PER HR: HCPCS

## 2024-05-28 PROCEDURE — 93306 TTE W/DOPPLER COMPLETE: CPT | Performed by: INTERNAL MEDICINE

## 2024-05-28 PROCEDURE — 93306 TTE W/DOPPLER COMPLETE: CPT

## 2024-05-28 PROCEDURE — 6370000000 HC RX 637 (ALT 250 FOR IP): Performed by: INTERNAL MEDICINE

## 2024-05-28 PROCEDURE — 80048 BASIC METABOLIC PNL TOTAL CA: CPT

## 2024-05-28 PROCEDURE — 1100000000 HC RM PRIVATE

## 2024-05-28 PROCEDURE — 70551 MRI BRAIN STEM W/O DYE: CPT

## 2024-05-28 PROCEDURE — 1100000003 HC PRIVATE W/ TELEMETRY

## 2024-05-28 PROCEDURE — 97110 THERAPEUTIC EXERCISES: CPT

## 2024-05-28 PROCEDURE — 85025 COMPLETE CBC W/AUTO DIFF WBC: CPT

## 2024-05-28 PROCEDURE — 6370000000 HC RX 637 (ALT 250 FOR IP): Performed by: HOSPITALIST

## 2024-05-28 PROCEDURE — 36415 COLL VENOUS BLD VENIPUNCTURE: CPT

## 2024-05-28 RX ORDER — FUROSEMIDE 20 MG/1
TABLET ORAL
Qty: 90 TABLET | OUTPATIENT
Start: 2024-05-28

## 2024-05-28 RX ORDER — POTASSIUM CHLORIDE 750 MG/1
40 TABLET, FILM COATED, EXTENDED RELEASE ORAL ONCE
Status: COMPLETED | OUTPATIENT
Start: 2024-05-28 | End: 2024-05-28

## 2024-05-28 RX ORDER — INSULIN GLARGINE 100 [IU]/ML
10 INJECTION, SOLUTION SUBCUTANEOUS NIGHTLY
Status: DISCONTINUED | OUTPATIENT
Start: 2024-05-28 | End: 2024-06-01

## 2024-05-28 RX ADMIN — ENOXAPARIN SODIUM 30 MG: 100 INJECTION SUBCUTANEOUS at 08:26

## 2024-05-28 RX ADMIN — POTASSIUM CHLORIDE 40 MEQ: 750 TABLET, FILM COATED, EXTENDED RELEASE ORAL at 08:27

## 2024-05-28 RX ADMIN — METOPROLOL SUCCINATE 50 MG: 50 TABLET, EXTENDED RELEASE ORAL at 08:27

## 2024-05-28 RX ADMIN — INSULIN GLARGINE 10 UNITS: 100 INJECTION, SOLUTION SUBCUTANEOUS at 21:11

## 2024-05-28 RX ADMIN — FUROSEMIDE 40 MG: 40 TABLET ORAL at 08:26

## 2024-05-28 RX ADMIN — VALACYCLOVIR HYDROCHLORIDE 1000 MG: 500 TABLET, FILM COATED ORAL at 08:26

## 2024-05-28 RX ADMIN — DILTIAZEM HYDROCHLORIDE 120 MG: 120 CAPSULE, EXTENDED RELEASE ORAL at 08:26

## 2024-05-28 RX ADMIN — HYDROCORTISONE: 25 CREAM TOPICAL at 08:29

## 2024-05-28 RX ADMIN — INSULIN LISPRO 2 UNITS: 100 INJECTION, SOLUTION INTRAVENOUS; SUBCUTANEOUS at 13:09

## 2024-05-28 RX ADMIN — CETIRIZINE HYDROCHLORIDE 5 MG: 10 TABLET, FILM COATED ORAL at 08:27

## 2024-05-28 RX ADMIN — ASPIRIN 81 MG: 81 TABLET, COATED ORAL at 08:27

## 2024-05-28 RX ADMIN — ATORVASTATIN CALCIUM 10 MG: 10 TABLET, FILM COATED ORAL at 08:26

## 2024-05-28 RX ADMIN — HYDROCORTISONE: 25 CREAM TOPICAL at 21:13

## 2024-05-28 RX ADMIN — MICONAZOLE NITRATE: 20 POWDER TOPICAL at 21:13

## 2024-05-28 RX ADMIN — MICONAZOLE NITRATE: 20 POWDER TOPICAL at 08:29

## 2024-05-28 ASSESSMENT — PAIN SCALES - GENERAL
PAINLEVEL_OUTOF10: 0
PAINLEVEL_OUTOF10: 0

## 2024-05-28 NOTE — CARE COORDINATION
Transition of Care Plan:    RUR: 15% Moderate   Prior Level of Functioning: Required assistance with some adls/iadls.   Disposition: SNF vs Swing   If SNF or IPR: Date FOC offered:   Date FOC received:   Accepting facility:   Date authorization started with reference number:   Date authorization received and expires:   Follow up appointments:   DME needed:   Transportation at discharge:   /[facility] Medicare/ letter given:   Is patient a Gypsy and connected with VA?    If yes, was Gypsy transfer form completed and VA notified?   Caregiver Contact:   Discharge Caregiver contacted prior to discharge?   Care Conference needed?   Barriers to discharge: Placement/recommendations.       1353: Spoke with patient's daughter Bettie Manuel regarding discharge plans. Told her we are waiting to hear back from PT regarding discharge recommendations--swing bed vs SNF. Only ONE auth can be started at one time. Told her patient will NOT be discharged today. In meantime, will check with [facility] to see if they have approved. E-mail sent to Netta at [facility].

## 2024-05-29 LAB
ALBUMIN SERPL-MCNC: 2.7 G/DL (ref 3.5–5)
ANION GAP SERPL CALC-SCNC: 7 MMOL/L (ref 5–15)
BUN SERPL-MCNC: 26 MG/DL (ref 6–20)
BUN/CREAT SERPL: 16 (ref 12–20)
CALCIUM SERPL-MCNC: 8.3 MG/DL (ref 8.5–10.1)
CHLORIDE SERPL-SCNC: 107 MMOL/L (ref 97–108)
CO2 SERPL-SCNC: 28 MMOL/L (ref 21–32)
CREAT SERPL-MCNC: 1.6 MG/DL (ref 0.55–1.02)
FOLATE SERPL-MCNC: 17.1 NG/ML (ref 5–21)
GLUCOSE BLD STRIP.AUTO-MCNC: 138 MG/DL (ref 65–117)
GLUCOSE BLD STRIP.AUTO-MCNC: 162 MG/DL (ref 65–117)
GLUCOSE BLD STRIP.AUTO-MCNC: 171 MG/DL (ref 65–117)
GLUCOSE BLD STRIP.AUTO-MCNC: 224 MG/DL (ref 65–117)
GLUCOSE SERPL-MCNC: 148 MG/DL (ref 65–100)
PHOSPHATE SERPL-MCNC: 3.1 MG/DL (ref 2.6–4.7)
POTASSIUM SERPL-SCNC: 3.8 MMOL/L (ref 3.5–5.1)
SERVICE CMNT-IMP: ABNORMAL
SODIUM SERPL-SCNC: 142 MMOL/L (ref 136–145)
VIT B12 SERPL-MCNC: 435 PG/ML (ref 193–986)

## 2024-05-29 PROCEDURE — 6370000000 HC RX 637 (ALT 250 FOR IP): Performed by: INTERNAL MEDICINE

## 2024-05-29 PROCEDURE — G0378 HOSPITAL OBSERVATION PER HR: HCPCS

## 2024-05-29 PROCEDURE — 82607 VITAMIN B-12: CPT

## 2024-05-29 PROCEDURE — 96372 THER/PROPH/DIAG INJ SC/IM: CPT

## 2024-05-29 PROCEDURE — 1100000000 HC RM PRIVATE

## 2024-05-29 PROCEDURE — 80069 RENAL FUNCTION PANEL: CPT

## 2024-05-29 PROCEDURE — 6370000000 HC RX 637 (ALT 250 FOR IP): Performed by: HOSPITALIST

## 2024-05-29 PROCEDURE — 36415 COLL VENOUS BLD VENIPUNCTURE: CPT

## 2024-05-29 PROCEDURE — 82962 GLUCOSE BLOOD TEST: CPT

## 2024-05-29 PROCEDURE — 97535 SELF CARE MNGMENT TRAINING: CPT

## 2024-05-29 PROCEDURE — 82746 ASSAY OF FOLIC ACID SERUM: CPT

## 2024-05-29 PROCEDURE — 6360000002 HC RX W HCPCS: Performed by: INTERNAL MEDICINE

## 2024-05-29 PROCEDURE — 94760 N-INVAS EAR/PLS OXIMETRY 1: CPT

## 2024-05-29 RX ADMIN — METOPROLOL SUCCINATE 50 MG: 50 TABLET, EXTENDED RELEASE ORAL at 09:27

## 2024-05-29 RX ADMIN — DILTIAZEM HYDROCHLORIDE 120 MG: 120 CAPSULE, EXTENDED RELEASE ORAL at 09:28

## 2024-05-29 RX ADMIN — INSULIN GLARGINE 10 UNITS: 100 INJECTION, SOLUTION SUBCUTANEOUS at 21:27

## 2024-05-29 RX ADMIN — ASPIRIN 81 MG: 81 TABLET, COATED ORAL at 09:27

## 2024-05-29 RX ADMIN — HYDROCORTISONE: 25 CREAM TOPICAL at 09:32

## 2024-05-29 RX ADMIN — VALACYCLOVIR HYDROCHLORIDE 1000 MG: 500 TABLET, FILM COATED ORAL at 09:28

## 2024-05-29 RX ADMIN — MICONAZOLE NITRATE: 20 POWDER TOPICAL at 21:32

## 2024-05-29 RX ADMIN — ENOXAPARIN SODIUM 30 MG: 100 INJECTION SUBCUTANEOUS at 09:27

## 2024-05-29 RX ADMIN — FUROSEMIDE 40 MG: 40 TABLET ORAL at 09:27

## 2024-05-29 RX ADMIN — MICONAZOLE NITRATE: 20 POWDER TOPICAL at 09:32

## 2024-05-29 RX ADMIN — ATORVASTATIN CALCIUM 10 MG: 10 TABLET, FILM COATED ORAL at 09:27

## 2024-05-29 RX ADMIN — HYDROCORTISONE: 25 CREAM TOPICAL at 21:33

## 2024-05-29 NOTE — CARE COORDINATION
Transition of Care Plan:     RUR: 15% Moderate   Prior Level of Functioning: Required assistance with some adls/iadls.   Disposition: SNF vs Swing   If SNF or IPR: Date FOC offered:   Date FOC received:   Accepting facility:   Date authorization started with reference number:   Date authorization received and expires:   Follow up appointments:   DME needed:   Transportation at discharge:   IM/[facility] Medicare/ letter given:   Is patient a  and connected with VA?               If yes, was [place] transfer form completed and VA notified?   Caregiver Contact:   Discharge Caregiver contacted prior to discharge?   Care Conference needed?   Barriers to discharge: Placement/recommendations.     0945: Updated PT/OT notes sent to Netta at [facility]. Went ahead and told Netta to start auth.     1145: Spoke with daughter about discharge plans. She is okay with plan to transfer to [facility] once Auth obtained.

## 2024-05-30 LAB
GLUCOSE BLD STRIP.AUTO-MCNC: 110 MG/DL (ref 65–117)
GLUCOSE BLD STRIP.AUTO-MCNC: 180 MG/DL (ref 65–117)
GLUCOSE BLD STRIP.AUTO-MCNC: 187 MG/DL (ref 65–117)
GLUCOSE BLD STRIP.AUTO-MCNC: 191 MG/DL (ref 65–117)
SERVICE CMNT-IMP: ABNORMAL
SERVICE CMNT-IMP: NORMAL

## 2024-05-30 PROCEDURE — G0378 HOSPITAL OBSERVATION PER HR: HCPCS

## 2024-05-30 PROCEDURE — 6370000000 HC RX 637 (ALT 250 FOR IP): Performed by: INTERNAL MEDICINE

## 2024-05-30 PROCEDURE — 96372 THER/PROPH/DIAG INJ SC/IM: CPT

## 2024-05-30 PROCEDURE — 6370000000 HC RX 637 (ALT 250 FOR IP): Performed by: HOSPITALIST

## 2024-05-30 PROCEDURE — 82962 GLUCOSE BLOOD TEST: CPT

## 2024-05-30 PROCEDURE — 94760 N-INVAS EAR/PLS OXIMETRY 1: CPT

## 2024-05-30 PROCEDURE — 1100000000 HC RM PRIVATE

## 2024-05-30 PROCEDURE — 6360000002 HC RX W HCPCS: Performed by: INTERNAL MEDICINE

## 2024-05-30 PROCEDURE — 97110 THERAPEUTIC EXERCISES: CPT

## 2024-05-30 PROCEDURE — 97535 SELF CARE MNGMENT TRAINING: CPT

## 2024-05-30 RX ADMIN — HYDROCORTISONE: 25 CREAM TOPICAL at 20:32

## 2024-05-30 RX ADMIN — MICONAZOLE NITRATE: 20 POWDER TOPICAL at 21:45

## 2024-05-30 RX ADMIN — FUROSEMIDE 40 MG: 40 TABLET ORAL at 09:22

## 2024-05-30 RX ADMIN — MICONAZOLE NITRATE: 20 POWDER TOPICAL at 09:27

## 2024-05-30 RX ADMIN — ASPIRIN 81 MG: 81 TABLET, COATED ORAL at 09:21

## 2024-05-30 RX ADMIN — VALACYCLOVIR HYDROCHLORIDE 1000 MG: 500 TABLET, FILM COATED ORAL at 09:19

## 2024-05-30 RX ADMIN — INSULIN GLARGINE 10 UNITS: 100 INJECTION, SOLUTION SUBCUTANEOUS at 20:32

## 2024-05-30 RX ADMIN — DILTIAZEM HYDROCHLORIDE 120 MG: 120 CAPSULE, EXTENDED RELEASE ORAL at 09:21

## 2024-05-30 RX ADMIN — HYDROCORTISONE: 25 CREAM TOPICAL at 09:23

## 2024-05-30 RX ADMIN — ATORVASTATIN CALCIUM 10 MG: 10 TABLET, FILM COATED ORAL at 09:21

## 2024-05-30 RX ADMIN — ENOXAPARIN SODIUM 30 MG: 100 INJECTION SUBCUTANEOUS at 09:22

## 2024-05-30 RX ADMIN — METOPROLOL SUCCINATE 50 MG: 50 TABLET, EXTENDED RELEASE ORAL at 09:20

## 2024-05-30 NOTE — CARE COORDINATION
Transition of Care Plan:     RUR: 15% Moderate   Prior Level of Functioning: Required assistance with some adls/iadls.   Disposition: SNF vs Swing   If SNF or IPR: Date FOC offered:   Date FOC received:   Accepting facility:   Date authorization started with reference number: 5/28   Date authorization received and expires:   Follow up appointments:   DME needed: Defer to facility   Transportation at discharge:   IM/IMM Medicare/ letter given: 1st IMM 1st IMM obtained by nrsg supervisor,   Is patient a [place] and connected with VA?               If yes, was [place] transfer form completed and VA notified?   Caregiver Contact:   Discharge Caregiver contacted prior to discharge?   Care Conference needed?   Barriers to discharge: Auth       0945: Checked with Netta at [facility] about auth. She has NOT heard anything yet.     1159: Checked on auth. Netta has NOT heard yet.   1151

## 2024-05-31 ENCOUNTER — APPOINTMENT (OUTPATIENT)
Facility: HOSPITAL | Age: 89
DRG: 069 | End: 2024-05-31
Payer: MEDICARE

## 2024-05-31 LAB
GLUCOSE BLD STRIP.AUTO-MCNC: 113 MG/DL (ref 65–117)
GLUCOSE BLD STRIP.AUTO-MCNC: 178 MG/DL (ref 65–117)
GLUCOSE BLD STRIP.AUTO-MCNC: 184 MG/DL (ref 65–117)
GLUCOSE BLD STRIP.AUTO-MCNC: 231 MG/DL (ref 65–117)
SERVICE CMNT-IMP: ABNORMAL
SERVICE CMNT-IMP: NORMAL

## 2024-05-31 PROCEDURE — G0378 HOSPITAL OBSERVATION PER HR: HCPCS

## 2024-05-31 PROCEDURE — 94760 N-INVAS EAR/PLS OXIMETRY 1: CPT

## 2024-05-31 PROCEDURE — 97110 THERAPEUTIC EXERCISES: CPT

## 2024-05-31 PROCEDURE — 82962 GLUCOSE BLOOD TEST: CPT

## 2024-05-31 PROCEDURE — 97535 SELF CARE MNGMENT TRAINING: CPT

## 2024-05-31 PROCEDURE — 96372 THER/PROPH/DIAG INJ SC/IM: CPT

## 2024-05-31 PROCEDURE — 6370000000 HC RX 637 (ALT 250 FOR IP): Performed by: INTERNAL MEDICINE

## 2024-05-31 PROCEDURE — 6360000002 HC RX W HCPCS: Performed by: INTERNAL MEDICINE

## 2024-05-31 PROCEDURE — 1100000000 HC RM PRIVATE

## 2024-05-31 PROCEDURE — 6370000000 HC RX 637 (ALT 250 FOR IP): Performed by: HOSPITALIST

## 2024-05-31 PROCEDURE — 73562 X-RAY EXAM OF KNEE 3: CPT

## 2024-05-31 PROCEDURE — 73630 X-RAY EXAM OF FOOT: CPT

## 2024-05-31 PROCEDURE — 97116 GAIT TRAINING THERAPY: CPT

## 2024-05-31 PROCEDURE — 72192 CT PELVIS W/O DYE: CPT

## 2024-05-31 RX ORDER — REPAGLINIDE 1 MG/1
1 TABLET ORAL
Status: DISCONTINUED | OUTPATIENT
Start: 2024-05-31 | End: 2024-05-31

## 2024-05-31 RX ORDER — INSULIN LISPRO 100 [IU]/ML
0-8 INJECTION, SOLUTION INTRAVENOUS; SUBCUTANEOUS
Status: DISCONTINUED | OUTPATIENT
Start: 2024-05-31 | End: 2024-06-03

## 2024-05-31 RX ORDER — REPAGLINIDE 1 MG/1
0.5 TABLET ORAL
Status: DISCONTINUED | OUTPATIENT
Start: 2024-05-31 | End: 2024-06-04 | Stop reason: HOSPADM

## 2024-05-31 RX ADMIN — MICONAZOLE NITRATE: 20 POWDER TOPICAL at 08:29

## 2024-05-31 RX ADMIN — DILTIAZEM HYDROCHLORIDE 120 MG: 120 CAPSULE, EXTENDED RELEASE ORAL at 08:29

## 2024-05-31 RX ADMIN — ATORVASTATIN CALCIUM 10 MG: 10 TABLET, FILM COATED ORAL at 08:29

## 2024-05-31 RX ADMIN — HYDROCORTISONE: 25 CREAM TOPICAL at 21:38

## 2024-05-31 RX ADMIN — FUROSEMIDE 40 MG: 40 TABLET ORAL at 08:29

## 2024-05-31 RX ADMIN — ACETAMINOPHEN 650 MG: 325 TABLET ORAL at 03:37

## 2024-05-31 RX ADMIN — ENOXAPARIN SODIUM 30 MG: 100 INJECTION SUBCUTANEOUS at 08:28

## 2024-05-31 RX ADMIN — REPAGLINIDE 0.5 MG: 1 TABLET ORAL at 15:09

## 2024-05-31 RX ADMIN — INSULIN GLARGINE 10 UNITS: 100 INJECTION, SOLUTION SUBCUTANEOUS at 21:37

## 2024-05-31 RX ADMIN — ASPIRIN 81 MG: 81 TABLET, COATED ORAL at 08:29

## 2024-05-31 RX ADMIN — MICONAZOLE NITRATE: 20 POWDER TOPICAL at 21:38

## 2024-05-31 RX ADMIN — VALACYCLOVIR HYDROCHLORIDE 1000 MG: 500 TABLET, FILM COATED ORAL at 08:29

## 2024-05-31 RX ADMIN — METOPROLOL SUCCINATE 50 MG: 50 TABLET, EXTENDED RELEASE ORAL at 08:29

## 2024-05-31 RX ADMIN — ACETAMINOPHEN 650 MG: 325 TABLET ORAL at 12:55

## 2024-05-31 ASSESSMENT — PAIN DESCRIPTION - ORIENTATION: ORIENTATION: RIGHT;LEFT

## 2024-05-31 ASSESSMENT — PAIN SCALES - WONG BAKER: WONGBAKER_NUMERICALRESPONSE: NO HURT

## 2024-05-31 ASSESSMENT — PAIN SCALES - GENERAL
PAINLEVEL_OUTOF10: 3
PAINLEVEL_OUTOF10: 1
PAINLEVEL_OUTOF10: 5

## 2024-05-31 ASSESSMENT — PAIN DESCRIPTION - LOCATION
LOCATION: LEG
LOCATION: LEG

## 2024-05-31 ASSESSMENT — PAIN DESCRIPTION - DESCRIPTORS: DESCRIPTORS: ACHING

## 2024-05-31 NOTE — CARE COORDINATION
Transition of Care Plan:     RUR: 15% Moderate   Prior Level of Functioning: Required assistance with some adls/iadls.   Disposition: SNF vs Swing   If SNF or IPR: Date FOC offered:   Date FOC received:   Accepting facility:   Date authorization started with reference number: 5/28   Date authorization received and expires:   Follow up appointments:   DME needed: Defer to facility   Transportation at discharge:   IM/IMM Medicare/ letter given: 1st IMM 1st IMM obtained by nrsg supervisor, 05/31/24 Mirna Pérez CM  Is patient a [place] and connected with VA?               If yes, was  transfer form completed and VA notified?   Caregiver Contact:   Discharge Caregiver contacted prior to discharge?   Care Conference needed?   Barriers to discharge: Son Dowling, [facility], has still not received authorization request decision from the [facility] Medicare plan. Avoidable delay notation in chart this date.   I delivered a second IM letter this afternoon.

## 2024-06-01 PROBLEM — L30.8: Status: ACTIVE | Noted: 2024-06-01

## 2024-06-01 LAB
ALBUMIN SERPL-MCNC: 2.5 G/DL (ref 3.5–5)
ALBUMIN/GLOB SERPL: 0.6 (ref 1.1–2.2)
ALP SERPL-CCNC: 141 U/L (ref 45–117)
ALT SERPL-CCNC: 37 U/L (ref 12–78)
ANION GAP SERPL CALC-SCNC: 10 MMOL/L (ref 5–15)
AST SERPL-CCNC: 58 U/L (ref 15–37)
BILIRUB SERPL-MCNC: 1 MG/DL (ref 0.2–1)
BUN SERPL-MCNC: 22 MG/DL (ref 6–20)
BUN/CREAT SERPL: 15 (ref 12–20)
CALCIUM SERPL-MCNC: 8.4 MG/DL (ref 8.5–10.1)
CHLORIDE SERPL-SCNC: 105 MMOL/L (ref 97–108)
CO2 SERPL-SCNC: 28 MMOL/L (ref 21–32)
CREAT SERPL-MCNC: 1.5 MG/DL (ref 0.55–1.02)
ERYTHROCYTE [DISTWIDTH] IN BLOOD BY AUTOMATED COUNT: 14.7 % (ref 11.5–14.5)
GLOBULIN SER CALC-MCNC: 4 G/DL (ref 2–4)
GLUCOSE BLD STRIP.AUTO-MCNC: 112 MG/DL (ref 65–117)
GLUCOSE BLD STRIP.AUTO-MCNC: 136 MG/DL (ref 65–117)
GLUCOSE BLD STRIP.AUTO-MCNC: 138 MG/DL (ref 65–117)
GLUCOSE BLD STRIP.AUTO-MCNC: 165 MG/DL (ref 65–117)
GLUCOSE BLD STRIP.AUTO-MCNC: 64 MG/DL (ref 65–117)
GLUCOSE BLD STRIP.AUTO-MCNC: 82 MG/DL (ref 65–117)
GLUCOSE SERPL-MCNC: 56 MG/DL (ref 65–100)
HCT VFR BLD AUTO: 34.4 % (ref 35–47)
HGB BLD-MCNC: 11 G/DL (ref 11.5–16)
MCH RBC QN AUTO: 29.6 PG (ref 26–34)
MCHC RBC AUTO-ENTMCNC: 32 G/DL (ref 30–36.5)
MCV RBC AUTO: 92.7 FL (ref 80–99)
NRBC # BLD: 0 K/UL (ref 0–0.01)
NRBC BLD-RTO: 0 PER 100 WBC
PLATELET # BLD AUTO: 198 K/UL (ref 150–400)
PMV BLD AUTO: 12.2 FL (ref 8.9–12.9)
POTASSIUM SERPL-SCNC: 3.4 MMOL/L (ref 3.5–5.1)
PROT SERPL-MCNC: 6.5 G/DL (ref 6.4–8.2)
RBC # BLD AUTO: 3.71 M/UL (ref 3.8–5.2)
SERVICE CMNT-IMP: ABNORMAL
SERVICE CMNT-IMP: NORMAL
SERVICE CMNT-IMP: NORMAL
SODIUM SERPL-SCNC: 143 MMOL/L (ref 136–145)
WBC # BLD AUTO: 8.8 K/UL (ref 3.6–11)

## 2024-06-01 PROCEDURE — 6360000002 HC RX W HCPCS: Performed by: INTERNAL MEDICINE

## 2024-06-01 PROCEDURE — 97110 THERAPEUTIC EXERCISES: CPT

## 2024-06-01 PROCEDURE — 80053 COMPREHEN METABOLIC PANEL: CPT

## 2024-06-01 PROCEDURE — 94760 N-INVAS EAR/PLS OXIMETRY 1: CPT

## 2024-06-01 PROCEDURE — 96372 THER/PROPH/DIAG INJ SC/IM: CPT

## 2024-06-01 PROCEDURE — 1100000000 HC RM PRIVATE

## 2024-06-01 PROCEDURE — 6370000000 HC RX 637 (ALT 250 FOR IP): Performed by: INTERNAL MEDICINE

## 2024-06-01 PROCEDURE — 36415 COLL VENOUS BLD VENIPUNCTURE: CPT

## 2024-06-01 PROCEDURE — G0378 HOSPITAL OBSERVATION PER HR: HCPCS

## 2024-06-01 PROCEDURE — 85027 COMPLETE CBC AUTOMATED: CPT

## 2024-06-01 PROCEDURE — 97116 GAIT TRAINING THERAPY: CPT

## 2024-06-01 PROCEDURE — 6370000000 HC RX 637 (ALT 250 FOR IP): Performed by: HOSPITALIST

## 2024-06-01 PROCEDURE — 82962 GLUCOSE BLOOD TEST: CPT

## 2024-06-01 RX ORDER — TRIAMCINOLONE ACETONIDE 1 MG/G
OINTMENT TOPICAL 3 TIMES DAILY
Status: DISCONTINUED | OUTPATIENT
Start: 2024-06-01 | End: 2024-06-04 | Stop reason: HOSPADM

## 2024-06-01 RX ORDER — CEPHALEXIN 250 MG/1
250 CAPSULE ORAL EVERY 12 HOURS SCHEDULED
Status: DISCONTINUED | OUTPATIENT
Start: 2024-06-01 | End: 2024-06-04 | Stop reason: HOSPADM

## 2024-06-01 RX ORDER — HYDROXYZINE HYDROCHLORIDE 25 MG/1
25 TABLET, FILM COATED ORAL EVERY 12 HOURS SCHEDULED
Status: DISCONTINUED | OUTPATIENT
Start: 2024-06-01 | End: 2024-06-03

## 2024-06-01 RX ADMIN — FUROSEMIDE 40 MG: 40 TABLET ORAL at 09:07

## 2024-06-01 RX ADMIN — ATORVASTATIN CALCIUM 10 MG: 10 TABLET, FILM COATED ORAL at 09:07

## 2024-06-01 RX ADMIN — DILTIAZEM HYDROCHLORIDE 120 MG: 120 CAPSULE, EXTENDED RELEASE ORAL at 09:07

## 2024-06-01 RX ADMIN — MICONAZOLE NITRATE: 20 POWDER TOPICAL at 09:32

## 2024-06-01 RX ADMIN — REPAGLINIDE 0.5 MG: 1 TABLET ORAL at 15:08

## 2024-06-01 RX ADMIN — VALACYCLOVIR HYDROCHLORIDE 1000 MG: 500 TABLET, FILM COATED ORAL at 09:07

## 2024-06-01 RX ADMIN — ENOXAPARIN SODIUM 30 MG: 100 INJECTION SUBCUTANEOUS at 09:07

## 2024-06-01 RX ADMIN — HYDROCORTISONE: 25 CREAM TOPICAL at 21:48

## 2024-06-01 RX ADMIN — HYDROXYZINE HYDROCHLORIDE 25 MG: 25 TABLET, FILM COATED ORAL at 21:47

## 2024-06-01 RX ADMIN — CEPHALEXIN 250 MG: 250 CAPSULE ORAL at 21:47

## 2024-06-01 RX ADMIN — METOPROLOL SUCCINATE 50 MG: 50 TABLET, EXTENDED RELEASE ORAL at 09:07

## 2024-06-01 RX ADMIN — ASPIRIN 81 MG: 81 TABLET, COATED ORAL at 09:07

## 2024-06-02 LAB
GLUCOSE BLD STRIP.AUTO-MCNC: 110 MG/DL (ref 65–117)
GLUCOSE BLD STRIP.AUTO-MCNC: 130 MG/DL (ref 65–117)
GLUCOSE BLD STRIP.AUTO-MCNC: 169 MG/DL (ref 65–117)
GLUCOSE BLD STRIP.AUTO-MCNC: 193 MG/DL (ref 65–117)
GLUCOSE BLD STRIP.AUTO-MCNC: 79 MG/DL (ref 65–117)
GLUCOSE BLD STRIP.AUTO-MCNC: 79 MG/DL (ref 65–117)
SERVICE CMNT-IMP: ABNORMAL
SERVICE CMNT-IMP: NORMAL

## 2024-06-02 PROCEDURE — 6370000000 HC RX 637 (ALT 250 FOR IP): Performed by: INTERNAL MEDICINE

## 2024-06-02 PROCEDURE — 6360000002 HC RX W HCPCS: Performed by: INTERNAL MEDICINE

## 2024-06-02 PROCEDURE — 82962 GLUCOSE BLOOD TEST: CPT

## 2024-06-02 PROCEDURE — G0378 HOSPITAL OBSERVATION PER HR: HCPCS

## 2024-06-02 PROCEDURE — 6370000000 HC RX 637 (ALT 250 FOR IP): Performed by: HOSPITALIST

## 2024-06-02 PROCEDURE — 94760 N-INVAS EAR/PLS OXIMETRY 1: CPT

## 2024-06-02 PROCEDURE — 1100000000 HC RM PRIVATE

## 2024-06-02 PROCEDURE — 96372 THER/PROPH/DIAG INJ SC/IM: CPT

## 2024-06-02 RX ADMIN — ATORVASTATIN CALCIUM 10 MG: 10 TABLET, FILM COATED ORAL at 09:19

## 2024-06-02 RX ADMIN — ENOXAPARIN SODIUM 30 MG: 100 INJECTION SUBCUTANEOUS at 09:19

## 2024-06-02 RX ADMIN — METOPROLOL SUCCINATE 50 MG: 50 TABLET, EXTENDED RELEASE ORAL at 09:19

## 2024-06-02 RX ADMIN — TRIAMCINOLONE ACETONIDE: 1 OINTMENT TOPICAL at 10:19

## 2024-06-02 RX ADMIN — FUROSEMIDE 40 MG: 40 TABLET ORAL at 09:19

## 2024-06-02 RX ADMIN — ASPIRIN 81 MG: 81 TABLET, COATED ORAL at 09:19

## 2024-06-02 RX ADMIN — REPAGLINIDE 0.5 MG: 1 TABLET ORAL at 17:03

## 2024-06-02 RX ADMIN — TRIAMCINOLONE ACETONIDE: 1 OINTMENT TOPICAL at 21:51

## 2024-06-02 RX ADMIN — HYDROCORTISONE: 25 CREAM TOPICAL at 21:51

## 2024-06-02 RX ADMIN — CEPHALEXIN 250 MG: 250 CAPSULE ORAL at 09:19

## 2024-06-02 RX ADMIN — HYDROXYZINE HYDROCHLORIDE 25 MG: 25 TABLET, FILM COATED ORAL at 09:19

## 2024-06-02 RX ADMIN — HYDROXYZINE HYDROCHLORIDE 25 MG: 25 TABLET, FILM COATED ORAL at 21:51

## 2024-06-02 RX ADMIN — TRIAMCINOLONE ACETONIDE: 1 OINTMENT TOPICAL at 12:57

## 2024-06-02 RX ADMIN — CEPHALEXIN 250 MG: 250 CAPSULE ORAL at 21:51

## 2024-06-02 RX ADMIN — DILTIAZEM HYDROCHLORIDE 120 MG: 120 CAPSULE, EXTENDED RELEASE ORAL at 09:19

## 2024-06-03 ENCOUNTER — APPOINTMENT (OUTPATIENT)
Facility: HOSPITAL | Age: 89
DRG: 069 | End: 2024-06-03
Payer: MEDICARE

## 2024-06-03 DIAGNOSIS — Z79.4 TYPE 2 DIABETES MELLITUS WITH STAGE 3A CHRONIC KIDNEY DISEASE, WITH LONG-TERM CURRENT USE OF INSULIN (HCC): ICD-10-CM

## 2024-06-03 DIAGNOSIS — N18.31 TYPE 2 DIABETES MELLITUS WITH STAGE 3A CHRONIC KIDNEY DISEASE, WITH LONG-TERM CURRENT USE OF INSULIN (HCC): ICD-10-CM

## 2024-06-03 DIAGNOSIS — E11.22 TYPE 2 DIABETES MELLITUS WITH STAGE 3A CHRONIC KIDNEY DISEASE, WITH LONG-TERM CURRENT USE OF INSULIN (HCC): ICD-10-CM

## 2024-06-03 PROBLEM — M19.90 ARTHRITIS: Status: ACTIVE | Noted: 2024-06-03

## 2024-06-03 PROBLEM — I48.19 PERSISTENT ATRIAL FIBRILLATION (HCC): Status: ACTIVE | Noted: 2024-06-03

## 2024-06-03 LAB
GLUCOSE BLD STRIP.AUTO-MCNC: 125 MG/DL (ref 65–117)
GLUCOSE BLD STRIP.AUTO-MCNC: 174 MG/DL (ref 65–117)
GLUCOSE BLD STRIP.AUTO-MCNC: 182 MG/DL (ref 65–117)
GLUCOSE BLD STRIP.AUTO-MCNC: 239 MG/DL (ref 65–117)
SERVICE CMNT-IMP: ABNORMAL

## 2024-06-03 PROCEDURE — G0378 HOSPITAL OBSERVATION PER HR: HCPCS

## 2024-06-03 PROCEDURE — 82962 GLUCOSE BLOOD TEST: CPT

## 2024-06-03 PROCEDURE — 1100000000 HC RM PRIVATE

## 2024-06-03 PROCEDURE — 6370000000 HC RX 637 (ALT 250 FOR IP): Performed by: INTERNAL MEDICINE

## 2024-06-03 PROCEDURE — 6360000002 HC RX W HCPCS: Performed by: INTERNAL MEDICINE

## 2024-06-03 PROCEDURE — 6370000000 HC RX 637 (ALT 250 FOR IP): Performed by: HOSPITALIST

## 2024-06-03 PROCEDURE — 96372 THER/PROPH/DIAG INJ SC/IM: CPT

## 2024-06-03 PROCEDURE — 78306 BONE IMAGING WHOLE BODY: CPT | Performed by: ORTHOPAEDIC SURGERY

## 2024-06-03 PROCEDURE — 3430000000 HC RX DIAGNOSTIC RADIOPHARMACEUTICAL: Performed by: ORTHOPAEDIC SURGERY

## 2024-06-03 PROCEDURE — 97535 SELF CARE MNGMENT TRAINING: CPT

## 2024-06-03 PROCEDURE — A9503 TC99M MEDRONATE: HCPCS | Performed by: ORTHOPAEDIC SURGERY

## 2024-06-03 RX ORDER — HUMAN INSULIN 100 [USP'U]/ML
INJECTION, SUSPENSION SUBCUTANEOUS
Qty: 10 ML | Refills: 1 | OUTPATIENT
Start: 2024-06-03

## 2024-06-03 RX ORDER — ACETAMINOPHEN 325 MG/1
650 TABLET ORAL
Status: DISCONTINUED | OUTPATIENT
Start: 2024-06-03 | End: 2024-06-04 | Stop reason: HOSPADM

## 2024-06-03 RX ORDER — ACETAMINOPHEN 325 MG/1
650 TABLET ORAL 2 TIMES DAILY WITH MEALS
Qty: 60 TABLET | Refills: 0 | Status: SHIPPED | COMMUNITY
Start: 2024-06-03

## 2024-06-03 RX ORDER — DILTIAZEM HYDROCHLORIDE 120 MG/1
120 CAPSULE, COATED, EXTENDED RELEASE ORAL DAILY
Qty: 30 CAPSULE | Refills: 0 | Status: SHIPPED
Start: 2024-06-04

## 2024-06-03 RX ORDER — NAPROXEN 250 MG/1
250 TABLET ORAL 2 TIMES DAILY WITH MEALS
Qty: 6 TABLET | Refills: 0 | Status: SHIPPED
Start: 2024-06-03 | End: 2024-06-06

## 2024-06-03 RX ORDER — TRIAMCINOLONE ACETONIDE 1 MG/G
OINTMENT TOPICAL
Qty: 60 G | Refills: 0 | Status: SHIPPED
Start: 2024-06-03

## 2024-06-03 RX ORDER — REPAGLINIDE 0.5 MG/1
0.5 TABLET ORAL
Qty: 30 TABLET | Refills: 0 | Status: SHIPPED
Start: 2024-06-03

## 2024-06-03 RX ORDER — HYDROCORTISONE 25 MG/G
CREAM TOPICAL 2 TIMES DAILY PRN
Qty: 60 G | Refills: 0 | Status: SHIPPED
Start: 2024-06-03

## 2024-06-03 RX ORDER — CEPHALEXIN 250 MG/1
250 CAPSULE ORAL EVERY 12 HOURS SCHEDULED
Qty: 14 CAPSULE | Refills: 0 | Status: SHIPPED
Start: 2024-06-03 | End: 2024-06-10

## 2024-06-03 RX ORDER — TC 99M MEDRONATE 20 MG/10ML
21.6 INJECTION, POWDER, LYOPHILIZED, FOR SOLUTION INTRAVENOUS
Status: COMPLETED | OUTPATIENT
Start: 2024-06-03 | End: 2024-06-03

## 2024-06-03 RX ORDER — NAPROXEN 250 MG/1
250 TABLET ORAL 2 TIMES DAILY WITH MEALS
Status: DISCONTINUED | OUTPATIENT
Start: 2024-06-03 | End: 2024-06-04 | Stop reason: HOSPADM

## 2024-06-03 RX ORDER — POLYETHYLENE GLYCOL 3350 17 G/17G
17 POWDER, FOR SOLUTION ORAL DAILY PRN
Qty: 527 G | Refills: 1 | Status: SHIPPED | COMMUNITY
Start: 2024-06-03 | End: 2024-07-03

## 2024-06-03 RX ADMIN — ACETAMINOPHEN 650 MG: 325 TABLET ORAL at 20:02

## 2024-06-03 RX ADMIN — ENOXAPARIN SODIUM 30 MG: 100 INJECTION SUBCUTANEOUS at 09:19

## 2024-06-03 RX ADMIN — METOPROLOL SUCCINATE 50 MG: 50 TABLET, EXTENDED RELEASE ORAL at 09:19

## 2024-06-03 RX ADMIN — HYDROXYZINE HYDROCHLORIDE 25 MG: 25 TABLET, FILM COATED ORAL at 09:19

## 2024-06-03 RX ADMIN — REPAGLINIDE 0.5 MG: 1 TABLET ORAL at 16:54

## 2024-06-03 RX ADMIN — ACETAMINOPHEN 650 MG: 325 TABLET ORAL at 00:22

## 2024-06-03 RX ADMIN — CEPHALEXIN 250 MG: 250 CAPSULE ORAL at 09:19

## 2024-06-03 RX ADMIN — HYDROCORTISONE: 25 CREAM TOPICAL at 21:37

## 2024-06-03 RX ADMIN — ASPIRIN 81 MG: 81 TABLET, COATED ORAL at 09:18

## 2024-06-03 RX ADMIN — TRIAMCINOLONE ACETONIDE: 1 OINTMENT TOPICAL at 09:26

## 2024-06-03 RX ADMIN — HYDROCORTISONE: 25 CREAM TOPICAL at 09:27

## 2024-06-03 RX ADMIN — NAPROXEN 250 MG: 250 TABLET ORAL at 12:51

## 2024-06-03 RX ADMIN — TC 99M MEDRONATE 21.6 MILLICURIE: 20 INJECTION, POWDER, LYOPHILIZED, FOR SOLUTION INTRAVENOUS at 10:55

## 2024-06-03 RX ADMIN — TRIAMCINOLONE ACETONIDE: 1 OINTMENT TOPICAL at 21:37

## 2024-06-03 RX ADMIN — DILTIAZEM HYDROCHLORIDE 120 MG: 120 CAPSULE, EXTENDED RELEASE ORAL at 09:19

## 2024-06-03 RX ADMIN — ATORVASTATIN CALCIUM 10 MG: 10 TABLET, FILM COATED ORAL at 09:19

## 2024-06-03 RX ADMIN — NAPROXEN 250 MG: 250 TABLET ORAL at 16:54

## 2024-06-03 RX ADMIN — FUROSEMIDE 40 MG: 40 TABLET ORAL at 09:19

## 2024-06-03 RX ADMIN — CEPHALEXIN 250 MG: 250 CAPSULE ORAL at 21:36

## 2024-06-03 ASSESSMENT — PAIN SCALES - GENERAL
PAINLEVEL_OUTOF10: 7
PAINLEVEL_OUTOF10: 0
PAINLEVEL_OUTOF10: 3

## 2024-06-03 ASSESSMENT — PAIN DESCRIPTION - DESCRIPTORS: DESCRIPTORS: ACHING

## 2024-06-03 ASSESSMENT — PAIN SCALES - WONG BAKER: WONGBAKER_NUMERICALRESPONSE: NO HURT

## 2024-06-03 ASSESSMENT — PAIN DESCRIPTION - ORIENTATION
ORIENTATION: RIGHT;LEFT
ORIENTATION: LEFT

## 2024-06-03 ASSESSMENT — PAIN DESCRIPTION - LOCATION
LOCATION: LEG
LOCATION: KNEE

## 2024-06-03 NOTE — CARE COORDINATION
06/03/24 1605   Avoidable Days   Organizational/Internal/System        Test/Procedure Scheduling     Delayed d/t bone scan. Also, patient's daughter home now. Will just plan on dc tomorrow 6/3

## 2024-06-03 NOTE — DISCHARGE SUMMARY
[facility]  Hospitalist Discharge Summary    Patient ID:  Elias Bhandari  [id number]  89 y.o.  6/3/1929    PCP on record: Gracy Sr APRN - NP    Admit date: 5/24/2024  Discharge date and time: 6/3/2024 delayed to 6/4/24    DISCHARGE DIAGNOSIS:    Principal Problem:    TIA (transient ischemic attack)  Active Problems:    Persistent atrial fibrillation (HCC)    HLD (hyperlipidemia)    Type 2 diabetes mellitus with chronic kidney disease (HCC)    Primary hypertension    Venous stasis dermatitis    Acute vesicular dermatitis    Arthritis    CONSULTATIONS:  None    Excerpted HPI from H&P of Hugh Galaviz MD:  89 y.o. female presenting for admission to [facility] for further evaluation and treatment for TIA (transient ischemic attack).  She  has a past medical history of Abnormal echocardiogram, Asthma, Bilateral lower leg cellulitis, Bleeding hemorrhoids, Candida rash of groin, CKD (chronic kidney disease) stage 3, GFR 30-59 ml/min (HCC), COPD (chronic obstructive pulmonary disease) (HCC), HTN (hypertension), Pulmonary hypertension (HCC), Renal mass, Type 2 diabetes mellitus (HCC), and Venous stasis dermatitis..      Pt presents to ED with c/o facial droop today, and slurred speech yesterday.  Has h/o paroxysmal Afib off Warfarin due to h/o falls and GI bleed in the past.  Lives with daughter.  Family concerned for potential stroke.  They noted slurring of speech yesterday.  Today the family noted some speech slurring and L sided facial droop.  EMS appreciated the facial droop on arrival, but this resolved during transport.  Pt has h/o lifelong L LE weakness a/w possible Polio or Cerebral Palsy following difficult child birth.      Pt was seen in the ED on 5/17 with h/o falls.  Pt suddenly felt the legs were heavy.  She fell unwitnessed getting up from bed.  They reported 3 falls over the past 10days.  Recent progression of LE swelling.  She has been compliant with Lasix 40mg daily.  ED

## 2024-06-03 NOTE — CONSULTS
Ortho Consult Note      Complaints: Right lower extremity pain.    HPI: This is a 89-year-old female who states that she fell at home within the last couple weeks and sustained a skin tear over her left arm and has developed pain through the right lower extremity.  Orthopedics has been consulted to evaluate ongoing pain in the right lower extremity extremity which appears to be inhibiting her ability to get around.    Patient has lived with her daughter who has had difficulty managing her.  She was admitted because of facial droop and questionable CVA.  She has been treated with physical therapy for ambulation and has been complaining of right lower extremity pain when she ambulates.  In addition she is complaining of right hand pain.    I was able to observe her getting up with a nursing assistant to the bathroom and she seemed to be able to bear full weight on both lower extremities without difficulty.  She seemed to have overall good gait pattern with a shuffling gait to the bathroom.  Visit Vitals  BP (!) 144/78   Pulse 80   Temp 97.6 °F (36.4 °C) (Oral)   Resp 20   Ht 1.549 m (5' 1\")   Wt 86.2 kg (190 lb)   SpO2 96%   BMI 35.90 kg/m²     Physical Exam: BP (!) 144/78   Pulse 80   Temp 97.6 °F (36.4 °C) (Oral)   Resp 20   Ht 1.549 m (5' 1\")   Wt 86.2 kg (190 lb)   SpO2 96%   BMI 35.90 kg/m²   General appearance: Patient is obese and alert and talkative.  She appears to be oriented x 3.  She complains mainly of right knee pain.  She also complains of right hand pain and there has been reports of bilateral lower extremity pain from the staff.  Extremities: Examination of the extremities demonstrates that she has moderate edema through the right lower leg which appears to be related to venous stasis disease.  She denies any tenderness with palpation to the foot ankle leg and thigh and hip but she does report tenderness with palpation around the knee.  There is perhaps a small joint effusion present in the

## 2024-06-03 NOTE — DISCHARGE INSTRUCTIONS
HOSPITALIST RECOMMENDATIONS    Medication changes have been made during this acute hospitalization    New Rx for Naprosyn 250mg twice daily with meals for 3 days / 6 dose  Rx for acute joint inflammation in the 4th / 5th fingers of the R hand    New Rx for Dermatitis of the R lateral arm to be use next 7 days  Keflex 250mg twice daily and Kenalog Cr 0.1% twice daily to rash    Other medications are Rx routine for now  FELIX SOLORIO MD    [phone]

## 2024-06-03 NOTE — CARE COORDINATION
Transition of Care Plan:    RUR: 17% MODERATE   Prior Level of Functioning:   Disposition:   If SNF or IPR: Date FOC offered:   Date FOC received:   Accepting facility:   Date authorization started with reference number:   Date authorization received and expires:   Follow up appointments:   DME needed:   Transportation at discharge:   IM/IMM Medicare/ letter given: 1st IMM 5/25, 2nd 5/31 IMM obtained   Is patient a [place] and connected with VA?    If yes, was [place] transfer form completed and VA notified?   Caregiver Contact:   Discharge Caregiver contacted prior to discharge?   Care Conference needed?   Barriers to discharge: None     0830: Received a call from Netta at [facility]. Auth approved. MD made aware. Plan to transfer to [facility] today     0950: Called daughter and made her aware. She will be here in about an hour.

## 2024-06-03 NOTE — PLAN OF CARE
Problem: Occupational Therapy - Adult  Goal: By Discharge: Performs self-care activities at highest level of function for planned discharge setting.  See evaluation for individualized goals.  Description: Occupational Therapy Goals:  Initiated 5/27/2024  1.  Patient will perform upper body dressing with Minimal Assist within 7 day(s).  2.  Patient will perform lower body dressing with Moderate Assist within 7 day(s).  3.  Patient will perform self-feeding with Contact Guard Assist within 7 day(s).  4.  Patient will perform toilet transfers with Minimal Assist  within 7 day(s).  5.  Patient will perform all aspects of toileting with Moderate Assist within 7 day(s).  6.  Patient will participate in upper extremity therapeutic exercise/activities with Modified [place] for 8 minutes within 7 day(s).    7.  Patient will utilize energy conservation techniques during functional activities with verbal cues within 7 day(s)    Outcome: Progressing     OCCUPATIONAL THERAPY TREATMENT  Patient: Elias Bhandari (89 y.o. female)  Date: 5/29/2024  Primary Diagnosis: TIA (transient ischemic attack) [G45.9]       Precautions: Fall Risk, Bed Alarm                Chart, occupational therapy assessment, plan of care, and goals were reviewed.    ASSESSMENT  Patient continues to benefit from skilled OT services and is slowly progressing towards goals. Pt was seen while sitting in chair. Pt vitals were taken and recorded into flowsheets. Pt went from sit to stand with Mod A. Pt stated feeling weak today. Pt ambulated to bathroom with Min A and 2WW. Pt completed toilet transfer with Mod A and toileting with Max A. Pt ambulated back to chair and did stand to sit with Min A. Pt completed grooming with setup and CGA. Pt was left resting in chair while reclined with tray table and call bell within reach and family in room; chair alarm on.      PLAN :  Patient continues to benefit from skilled intervention to address the above impairments.  
  Problem: Occupational Therapy - Adult  Goal: By Discharge: Performs self-care activities at highest level of function for planned discharge setting.  See evaluation for individualized goals.  Description: Occupational Therapy Goals:  Initiated 5/27/2024  1.  Patient will perform upper body dressing with Minimal Assist within 7 day(s).  2.  Patient will perform lower body dressing with Moderate Assist within 7 day(s).  3.  Patient will perform self-feeding with Contact Guard Assist within 7 day(s).  4.  Patient will perform toilet transfers with Minimal Assist  within 7 day(s).  5.  Patient will perform all aspects of toileting with Moderate Assist within 7 day(s).  6.  Patient will participate in upper extremity therapeutic exercise/activities with Modified [place] for 8 minutes within 7 day(s).    7.  Patient will utilize energy conservation techniques during functional activities with verbal cues within 7 day(s)    Outcome: Not Progressing     OCCUPATIONAL THERAPY TREATMENT  Patient: Elias Bhandari (89 y.o. female)  Date: 5/31/2024  Primary Diagnosis: TIA (transient ischemic attack) [G45.9]       Precautions: Fall Risk, Bed Alarm                Chart, occupational therapy assessment, plan of care, and goals were reviewed.    ASSESSMENT  Patient continues to benefit from skilled OT services and is not progressing towards goals. Pt was found while resting in bed. Pt vitals were taken and entered into flowsheets. Pt was noted to have increase swelling in BLE. Pt completed grooming activities while sitting in bed with sup and set up. Pt also completed UB bathing while sitting in bed with setup and sup. Pt was left while sitting in bed with call bell and tray table within reach; bed alarm on.        PLAN :  Patient continues to benefit from skilled intervention to address the above impairments.  Continue treatment per established plan of care to address goals.    Recommend with staff: OOB for meals    Recommend 
  Problem: Occupational Therapy - Adult  Goal: By Discharge: Performs self-care activities at highest level of function for planned discharge setting.  See evaluation for individualized goals.  Description: Occupational Therapy Goals:  Initiated 5/27/2024  1.  Patient will perform upper body dressing with Minimal Assist within 7 day(s).  2.  Patient will perform lower body dressing with Moderate Assist within 7 day(s).  3.  Patient will perform self-feeding with Contact Guard Assist within 7 day(s).  4.  Patient will perform toilet transfers with Minimal Assist  within 7 day(s).  5.  Patient will perform all aspects of toileting with Moderate Assist within 7 day(s).  6.  Patient will participate in upper extremity therapeutic exercise/activities with Modified [place] for 8 minutes within 7 day(s).    7.  Patient will utilize energy conservation techniques during functional activities with verbal cues within 7 day(s).    Updated OT Goals:  1.  Patient will perform upper body dressing with Minimal Assist within 7 day(s).   2.  Patient will perform lower body dressing with Moderate Assist within 7 day(s).   3.  Patient will participate in upper extremity therapeutic exercise/activities with Modified [place] for 8 minutes within 7 day(s).     Outcome: Progressing     OCCUPATIONAL THERAPY TREATMENT: WEEKLY REASSESSMENT    Patient: Elias Bhandari (89 y.o. female)  Date: 6/3/2024  Primary Diagnosis: TIA (transient ischemic attack) [G45.9]       Precautions: Fall Risk, Bed Alarm                Chart, occupational therapy assessment, plan of care, and goals were reviewed.    ASSESSMENT  Patient continues to benefit from skilled OT services and is slowly progressing towards goals. Pt was found while standing with CNA. Pt vitals were taken by CNA. Pt ambulated to bathroom with CGA and 2WW. Pt completed toilet transfer with CGA and 2WW. Pt completed toileting with min A. Pt ambulated back to bed with CGA and 2WW. Pt 
  Problem: Occupational Therapy - Adult  Goal: By Discharge: Performs self-care activities at highest level of function for planned discharge setting.  See evaluation for individualized goals.  Description: Occupational Therapy Goals:  Initiated 5/27/2024  1.  Patient will perform upper body dressing with Minimal Assist within 7 day(s).  2.  Patient will perform lower body dressing with Moderate Assist within 7 day(s).  3.  Patient will perform self-feeding with Contact Guard Assist within 7 day(s).  4.  Patient will perform toilet transfers with Minimal Assist  within 7 day(s).  5.  Patient will perform all aspects of toileting with Moderate Assist within 7 day(s).  6.  Patient will participate in upper extremity therapeutic exercise/activities with Modified [place] for 8 minutes within 7 day(s).    7.  Patient will utilize energy conservation techniques during functional activities with verbal cues within 7 day(s)    Outcome: Not Progressing     OCCUPATIONAL THERAPY TREATMENT  Patient: Elias Bhandari (89 y.o. female)  Date: 5/30/2024  Primary Diagnosis: TIA (transient ischemic attack) [G45.9]       Precautions: Fall Risk, Bed Alarm                Chart, occupational therapy assessment, plan of care, and goals were reviewed.    ASSESSMENT  Patient continues to benefit from skilled OT services and is not progressing towards goals. Pt was found resting in chair. Vitals were taken and recorded into flowsheets. Pt donned shoes with Max A while sitting in chair. Pt attempted to go from sit to stand but was unable to stand up. Pt completed grooming while sitting in chair with CGA and setup. Pt was left resting in chair with tray table and call bell within reach; chair alarm on.          PLAN :  Patient continues to benefit from skilled intervention to address the above impairments.  Continue treatment per established plan of care to address goals.    Recommend with staff: OOB for meals    Recommend next OT session: 
  Problem: Occupational Therapy - Adult  Goal: By Discharge: Performs self-care activities at highest level of function for planned discharge setting.  See evaluation for individualized goals.  Outcome: Progressing   Occupational Therapy Goals:  Initiated 5/27/2024  1.  Patient will perform upper body dressing with Minimal Assist within 7 day(s).  2.  Patient will perform lower body dressing with Moderate Assist within 7 day(s).  3.  Patient will perform self-feeding with Contact Guard Assist within 7 day(s).  4.  Patient will perform toilet transfers with Minimal Assist  within 7 day(s).  5.  Patient will perform all aspects of toileting with Moderate Assist within 7 day(s).  6.  Patient will participate in upper extremity therapeutic exercise/activities with Modified [place] for 8 minutes within 7 day(s).    7.  Patient will utilize energy conservation techniques during functional activities with verbal cues within 7 day(s)    OCCUPATIONAL THERAPY TREATMENT  Patient: Elias Bhandari (89 y.o. female)  Date: 5/28/2024  Primary Diagnosis: TIA (transient ischemic attack) [G45.9]       Precautions: Fall Risk, Bed Alarm                Chart, occupational therapy assessment, plan of care, and goals were reviewed.    ASSESSMENT  Patient continues to benefit from skilled OT services and is progressing towards goals. Pt was found resting in bed. Pt stated having to go to the bathroom after not going all day. Pt educated on pure wick and being able to use the bathroom that way. Pt went from supine to sit with Mod A. Pt went from sit to stand with Min A and ambulated to toilet with Min A and 2WW. Pt completed toilet transfer with Min A. Pt toileting and LB dressing was Max A. Pt stood for ~3 minutes with Min A. Pt ambulated from toilet to chair with Min A and went from stand to sit with Min A. Pt needed verbal cues to use walker while sitting. Pt verbalized and demonstrated understanding. Pt was left resting comfortably in 
  Problem: Physical Therapy - Adult  Goal: By Discharge: Performs mobility at highest level of function for planned discharge setting.  See evaluation for individualized goals.  Description: FUNCTIONAL STATUS PRIOR TO ADMISSION: The patient required minimal assistance for basic and instrumental ADLs. Pt noted no AD used for ambulation at baseline (?questionable accuracy)     HOME SUPPORT PRIOR TO ADMISSION: The patient lived with daughter and required minimal assistance for mobility and ADLs. Patient had fallen several times in the past month and unable to be unsupervised according to daughter.     Physical Therapy Goals  Initiated 5/25/2024  1.  Patient will move from supine to sit and sit to supine in bed with supervision/set-up within 7 day(s).    2.  Patient will perform sit to stand with modified independence within 7 day(s).  3.  Patient will transfer from bed to chair and chair to bed with modified independence using the least restrictive device within 7 day(s).  4.  Patient will ambulate with modified independence for 70 feet with the least restrictive device within 7 day(s).     Outcome: Progressing   PHYSICAL THERAPY TREATMENT    Patient: Elias Bhandari (89 y.o. female)  Date: 5/31/2024  Diagnosis: TIA (transient ischemic attack) [G45.9] TIA (transient ischemic attack)      Precautions: Fall Risk, Bed Alarm                      ASSESSMENT:  Patient continues to benefit from skilled PT services and is not progressing towards goals. Patient self limiting ambulation distance today stating her right foot hurts too bad to do more. Also c/o knee pain but sites foot pain as limiting factor. Swelling noted in bilateral knees and ankles. Nursing/ MD notified. Patient also limiting knee ext exercises to 5 reps secondary to pain. Today patient able to ambulate 25 feet using wheeled walker with min assist following with chair. Will continue to follow and progress as able.           PLAN:  Patient continues to benefit 
  Problem: Physical Therapy - Adult  Goal: By Discharge: Performs mobility at highest level of function for planned discharge setting.  See evaluation for individualized goals.  Outcome: Progressing  Note: FUNCTIONAL STATUS PRIOR TO ADMISSION: The patient  required minimal assistance for basic and instrumental ADLs.    HOME SUPPORT PRIOR TO ADMISSION: The patient lived with daughter and required minimal assistance for mobility and ADLs. Patient had fallen several times in the past month and unable to be unsupervised according to daughter.    Physical Therapy Goals  Initiated 5/25/2024  1.  Patient will move from supine to sit and sit to supine in bed with supervision/set-up within 7 day(s).    2.  Patient will perform sit to stand with modified independence within 7 day(s).  3.  Patient will transfer from bed to chair and chair to bed with modified independence using the least restrictive device within 7 day(s).  4.  Patient will ambulate with modified independence for 70 feet with the least restrictive device within 7 day(s).      PHYSICAL THERAPY EVALUATION    Patient: Elias Bhandari (89 y.o. female)  Date: 5/25/2024  Primary Diagnosis: TIA (transient ischemic attack) [G45.9]       Precautions:      risk of falls, patient has been falling at home                  ASSESSMENT :   DEFICITS/IMPAIRMENTS:   The patient is limited by decreased functional mobility, independence in ADLs, strength, safety awareness.  Patient has a h/o L LE weakness- she may have had polio as a child.  Patient lives with daughter, and requires assistance for mobility and ADLs.  Patient has been falling at home     Based on the impairments listed above Patient will benefit from skilled PT to improve her safety, safety awareness, mobility and strength..    Patient will benefit from skilled intervention to address the above impairments.    Functional Outcome Measure:  The patient scored 2/5 on the [facility] standing outcome measure which 
  Problem: Safety - Adult  Goal: Free from fall injury  5/25/2024 0316 by Gino Sánchez, RN  Outcome: Progressing  5/25/2024 0316 by Gino Sánchez RN  Outcome: Progressing  5/24/2024 1855 by Barbara Stewart RN  Outcome: Progressing     Problem: ABCDS Injury Assessment  Goal: Absence of physical injury  5/24/2024 1855 by Barbara Stewart RN  Outcome: Progressing     
  Problem: Safety - Adult  Goal: Free from fall injury  5/25/2024 1050 by Emily Castrejon, RN  Outcome: Progressing     Problem: ABCDS Injury Assessment  Goal: Absence of physical injury  Outcome: Progressing     Problem: Chronic Conditions and Co-morbidities  Goal: Patient's chronic conditions and co-morbidity symptoms are monitored and maintained or improved  Outcome: Progressing     Problem: Skin/Tissue Integrity  Goal: Absence of new skin breakdown  Description: 1.  Monitor for areas of redness and/or skin breakdown  2.  Assess vascular access sites hourly  Outcome: Progressing     
  Problem: Safety - Adult  Goal: Free from fall injury  5/26/2024 1435 by Adela Almodovar LPN  Outcome: Progressing  5/26/2024 1000 by Gino Sánchez RN  Outcome: Progressing     Problem: ABCDS Injury Assessment  Goal: Absence of physical injury  5/26/2024 1435 by Adela Almodovar LPN  Outcome: Progressing  5/26/2024 1000 by Gino Sánchez RN  Outcome: Progressing     Problem: Chronic Conditions and Co-morbidities  Goal: Patient's chronic conditions and co-morbidity symptoms are monitored and maintained or improved  5/26/2024 1435 by Adela Almodovar LPN  Outcome: Progressing  5/26/2024 1004 by Gino Sánchez RN  Outcome: Progressing  5/26/2024 1000 by Gino Sánchez RN  Outcome: Progressing     Problem: Skin/Tissue Integrity  Goal: Absence of new skin breakdown  Description: 1.  Monitor for areas of redness and/or skin breakdown  2.  Assess vascular access sites hourly  3.  Every 4-6 hours minimum:  Change oxygen saturation probe site  4.  Every 4-6 hours:  If on nasal continuous positive airway pressure, respiratory therapy assess nares and determine need for appliance change or resting period.  5/26/2024 1435 by Adela Almodovar LPN  Outcome: Progressing  5/26/2024 1004 by Gino Sánchez RN  Outcome: Progressing  5/26/2024 1000 by Gino Sánchez RN  Outcome: Progressing     
  Problem: Safety - Adult  Goal: Free from fall injury  5/28/2024 0936 by Gino Sánchez RN  Outcome: Progressing  5/28/2024 0911 by Gino Sánchez RN  Outcome: Progressing     Problem: ABCDS Injury Assessment  Goal: Absence of physical injury  5/28/2024 0936 by Gino Sánchez RN  Outcome: Progressing  5/28/2024 0911 by Gino Sánchez RN  Outcome: Progressing     Problem: Chronic Conditions and Co-morbidities  Goal: Patient's chronic conditions and co-morbidity symptoms are monitored and maintained or improved  5/28/2024 0940 by Gino Sánchez RN  Outcome: Progressing  5/28/2024 0936 by Gino Sánchez RN  Outcome: Progressing  Flowsheets (Taken 5/28/2024 0400)  Care Plan - Patient's Chronic Conditions and Co-Morbidity Symptoms are Monitored and Maintained or Improved: Monitor and assess patient's chronic conditions and comorbid symptoms for stability, deterioration, or improvement     Problem: Skin/Tissue Integrity  Goal: Absence of new skin breakdown  Description: 1.  Monitor for areas of redness and/or skin breakdown  2.  Assess vascular access sites hourly  3.  Every 4-6 hours minimum:  Change oxygen saturation probe site  4.  Every 4-6 hours:  If on nasal continuous positive airway pressure, respiratory therapy assess nares and determine need for appliance change or resting period.  5/28/2024 0940 by Gino Sánchez RN  Outcome: Progressing  5/28/2024 0936 by Gino Sánchez RN  Outcome: Progressing  5/28/2024 0911 by Gino Sánchez RN  Outcome: Progressing     
  Problem: Safety - Adult  Goal: Free from fall injury  5/28/2024 0936 by Gino Sánchez RN  Outcome: Progressing  5/28/2024 0911 by Gino Sánchez RN  Outcome: Progressing     Problem: ABCDS Injury Assessment  Goal: Absence of physical injury  5/28/2024 0936 by Gino Sánchez RN  Outcome: Progressing  5/28/2024 0911 by Gino Sánchez RN  Outcome: Progressing     Problem: Chronic Conditions and Co-morbidities  Goal: Patient's chronic conditions and co-morbidity symptoms are monitored and maintained or improved  Outcome: Progressing  Flowsheets (Taken 5/28/2024 0400)  Care Plan - Patient's Chronic Conditions and Co-Morbidity Symptoms are Monitored and Maintained or Improved: Monitor and assess patient's chronic conditions and comorbid symptoms for stability, deterioration, or improvement     Problem: Skin/Tissue Integrity  Goal: Absence of new skin breakdown  Description: 1.  Monitor for areas of redness and/or skin breakdown  2.  Assess vascular access sites hourly  3.  Every 4-6 hours minimum:  Change oxygen saturation probe site  4.  Every 4-6 hours:  If on nasal continuous positive airway pressure, respiratory therapy assess nares and determine need for appliance change or resting period.  5/28/2024 0936 by Gino Sánchez RN  Outcome: Progressing  5/28/2024 0911 by Gino Sánchez RN  Outcome: Progressing     Problem: Pain  Goal: Verbalizes/displays adequate comfort level or baseline comfort level  Outcome: Progressing     
  Problem: Safety - Adult  Goal: Free from fall injury  5/28/2024 1841 by Emily Castrejon RN  Outcome: Progressing    Problem: ABCDS Injury Assessment  Goal: Absence of physical injury  5/28/2024 1841 by Emily Castrejon RN  Outcome: Progressing    Problem: Chronic Conditions and Co-morbidities  Goal: Patient's chronic conditions and co-morbidity symptoms are monitored and maintained or improved  5/28/2024 1841 by Emily Castrejon RN  Outcome: Progressing     Problem: Skin/Tissue Integrity  Goal: Absence of new skin breakdown  Description: 1.  Monitor for areas of redness and/or skin breakdown  2.  Assess vascular access sites hourly  5/28/2024 1841 by Emily Castrejon RN  Outcome: Progressing    Problem: Pain  Goal: Verbalizes/displays adequate comfort level or baseline comfort level  5/28/2024 1841 by Emily Castrejon RN  Outcome: Progressing      
  Problem: Safety - Adult  Goal: Free from fall injury  5/29/2024 0058 by Chelle Scales, RN  Outcome: Progressing  5/28/2024 1841 by Emily Castrejon, RN  Outcome: Progressing     
  Problem: Safety - Adult  Goal: Free from fall injury  5/29/2024 1324 by Adela Almodovar LPN  Outcome: Progressing  5/29/2024 0058 by Chelle Scales RN  Outcome: Progressing     Problem: ABCDS Injury Assessment  Goal: Absence of physical injury  Outcome: Progressing     Problem: Chronic Conditions and Co-morbidities  Goal: Patient's chronic conditions and co-morbidity symptoms are monitored and maintained or improved  Outcome: Progressing     Problem: Skin/Tissue Integrity  Goal: Absence of new skin breakdown  Description: 1.  Monitor for areas of redness and/or skin breakdown  2.  Assess vascular access sites hourly  3.  Every 4-6 hours minimum:  Change oxygen saturation probe site  4.  Every 4-6 hours:  If on nasal continuous positive airway pressure, respiratory therapy assess nares and determine need for appliance change or resting period.  Outcome: Progressing     Problem: Occupational Therapy - Adult  Goal: By Discharge: Performs self-care activities at highest level of function for planned discharge setting.  See evaluation for individualized goals.  Description: Occupational Therapy Goals:  Initiated 5/27/2024  1.  Patient will perform upper body dressing with Minimal Assist within 7 day(s).  2.  Patient will perform lower body dressing with Moderate Assist within 7 day(s).  3.  Patient will perform self-feeding with Contact Guard Assist within 7 day(s).  4.  Patient will perform toilet transfers with Minimal Assist  within 7 day(s).  5.  Patient will perform all aspects of toileting with Moderate Assist within 7 day(s).  6.  Patient will participate in upper extremity therapeutic exercise/activities with Modified [place] for 8 minutes within 7 day(s).    7.  Patient will utilize energy conservation techniques during functional activities with verbal cues within 7 day(s)    5/29/2024 1124 by Cristal Tejeda, ALISON  Outcome: Progressing     
  Problem: Safety - Adult  Goal: Free from fall injury  5/29/2024 2252 by Nicky Matta RN  Outcome: Progressing  5/29/2024 1324 by Adela Almodovar LPN  Outcome: Progressing     Problem: Chronic Conditions and Co-morbidities  Goal: Patient's chronic conditions and co-morbidity symptoms are monitored and maintained or improved  5/29/2024 1324 by Adela Almodovar LPN  Outcome: Progressing     
  Problem: Safety - Adult  Goal: Free from fall injury  6/2/2024 2351 by Nicky Matta RN  Outcome: Progressing  6/2/2024 1215 by Michele Luna RN  Outcome: Progressing     Problem: ABCDS Injury Assessment  Goal: Absence of physical injury  6/2/2024 2351 by Nicky Matta RN  Outcome: Progressing  6/2/2024 1215 by Michele Luna RN  Outcome: Progressing     
  Problem: Safety - Adult  Goal: Free from fall injury  Outcome: Progressing     Problem: ABCDS Injury Assessment  Goal: Absence of physical injury  Outcome: Progressing     
  Problem: Safety - Adult  Goal: Free from fall injury  Outcome: Progressing     Problem: ABCDS Injury Assessment  Goal: Absence of physical injury  Outcome: Progressing     
  Problem: Safety - Adult  Goal: Free from fall injury  Outcome: Progressing     Problem: ABCDS Injury Assessment  Goal: Absence of physical injury  Outcome: Progressing     Problem: Chronic Conditions and Co-morbidities  Goal: Patient's chronic conditions and co-morbidity symptoms are monitored and maintained or improved  Outcome: Progressing     Problem: Skin/Tissue Integrity  Goal: Absence of new skin breakdown  Description: 1.  Monitor for areas of redness and/or skin breakdown  2.  Assess vascular access sites hourly  3.  Every 4-6 hours minimum:  Change oxygen saturation probe site  4.  Every 4-6 hours:  If on nasal continuous positive airway pressure, respiratory therapy assess nares and determine need for appliance change or resting period.  Outcome: Progressing     
  Problem: Safety - Adult  Goal: Free from fall injury  Outcome: Progressing     Problem: ABCDS Injury Assessment  Goal: Absence of physical injury  Outcome: Progressing     Problem: Chronic Conditions and Co-morbidities  Goal: Patient's chronic conditions and co-morbidity symptoms are monitored and maintained or improved  Outcome: Progressing     Problem: Skin/Tissue Integrity  Goal: Absence of new skin breakdown  Description: 1.  Monitor for areas of redness and/or skin breakdown  2.  Assess vascular access sites hourly  3.  Every 4-6 hours minimum:  Change oxygen saturation probe site  4.  Every 4-6 hours:  If on nasal continuous positive airway pressure, respiratory therapy assess nares and determine need for appliance change or resting period.  Outcome: Progressing     Problem: Pain  Goal: Verbalizes/displays adequate comfort level or baseline comfort level  Outcome: Progressing     
  Problem: Safety - Adult  Goal: Free from fall injury  Outcome: Progressing     Problem: ABCDS Injury Assessment  Goal: Absence of physical injury  Outcome: Progressing     Problem: Physical Therapy - Adult  Goal: By Discharge: Performs mobility at highest level of function for planned discharge setting.  See evaluation for individualized goals.  Description: FUNCTIONAL STATUS PRIOR TO ADMISSION: The patient required minimal assistance for basic and instrumental ADLs. Pt noted no AD used for ambulation at baseline (?questionable accuracy)     HOME SUPPORT PRIOR TO ADMISSION: The patient lived with daughter and required minimal assistance for mobility and ADLs. Patient had fallen several times in the past month and unable to be unsupervised according to daughter.     Physical Therapy Goals  Initiated 5/25/2024  1.  Patient will move from supine to sit and sit to supine in bed with supervision/set-up within 7 day(s).    2.  Patient will perform sit to stand with modified independence within 7 day(s).  3.  Patient will transfer from bed to chair and chair to bed with modified independence using the least restrictive device within 7 day(s).  4.  Patient will ambulate with modified independence for 70 feet with the least restrictive device within 7 day(s).     5/30/2024 1418 by Ollie Bacon, PT  Outcome: Not Progressing     
  Problem: Safety - Adult  Goal: Free from fall injury  Outcome: Progressing     Problem: ABCDS Injury Assessment  Goal: Absence of physical injury  Outcome: Progressing     Problem: Skin/Tissue Integrity  Goal: Absence of new skin breakdown  Description: 1.  Monitor for areas of redness and/or skin breakdown  2.  Assess vascular access sites hourly  3.  Every 4-6 hours minimum:  Change oxygen saturation probe site  4.  Every 4-6 hours:  If on nasal continuous positive airway pressure, respiratory therapy assess nares and determine need for appliance change or resting period.  Outcome: Progressing     
  Problem: Safety - Adult  Goal: Free from fall injury  Outcome: Progressing     Problem: Chronic Conditions and Co-morbidities  Goal: Patient's chronic conditions and co-morbidity symptoms are monitored and maintained or improved  5/26/2024 1004 by Gino Sánchez RN  Outcome: Progressing  5/26/2024 1000 by Gino Sánchez, RN  Outcome: Progressing     Problem: Skin/Tissue Integrity  Goal: Absence of new skin breakdown  Description: 1.  Monitor for areas of redness and/or skin breakdown  2.  Assess vascular access sites hourly  3.  Every 4-6 hours minimum:  Change oxygen saturation probe site  4.  Every 4-6 hours:  If on nasal continuous positive airway pressure, respiratory therapy assess nares and determine need for appliance change or resting period.  5/26/2024 1004 by Gino Sánchez RN  Outcome: Progressing  5/26/2024 1000 by Gino Sánchez RN  Outcome: Progressing     
  Problem: Safety - Adult  Goal: Free from fall injury  Outcome: Progressing     Problem: Skin/Tissue Integrity  Goal: Absence of new skin breakdown  Description: 1.  Monitor for areas of redness and/or skin breakdown  2.  Assess vascular access sites hourly  3.  Every 4-6 hours minimum:  Change oxygen saturation probe site  4.  Every 4-6 hours:  If on nasal continuous positive airway pressure, respiratory therapy assess nares and determine need for appliance change or resting period.  Outcome: Progressing     
Bilateral Expiratory wheeze. Moderate SOB on exertion. No falls compliance with using call bell. BL breast groin folds abdominal yeast rash Nystantin topical.  
IV R wrist occluded unable to flush removed line charge nurse aware.  
Problem: Physical Therapy - Adult  Goal: By Discharge: Performs mobility at highest level of function for planned discharge setting.  See evaluation for individualized goals.  Description: FUNCTIONAL STATUS PRIOR TO ADMISSION: The patient required minimal assistance for basic and instrumental ADLs. Pt noted no AD used for ambulation at baseline (?questionable accuracy)     HOME SUPPORT PRIOR TO ADMISSION: The patient lived with daughter and required minimal assistance for mobility and ADLs. Patient had fallen several times in the past month and unable to be unsupervised according to daughter.     Physical Therapy Goals  Initiated 5/25/2024  1.  Patient will move from supine to sit and sit to supine in bed with supervision/set-up within 7 day(s).    2.  Patient will perform sit to stand with modified independence within 7 day(s).  3.  Patient will transfer from bed to chair and chair to bed with modified independence using the least restrictive device within 7 day(s).  4.  Patient will ambulate with modified independence for 70 feet with the least restrictive device within 7 day(s).     5/28/2024 1714 by Ollie Bacon, PT  Outcome: Progressing  PHYSICAL THERAPY TREATMENT    Patient: Elias Bhandari (89 y.o. female)  Date: 5/28/2024  Diagnosis: TIA (transient ischemic attack) [G45.9] TIA (transient ischemic attack)      Precautions: Fall Risk, Bed Alarm                      ASSESSMENT:  Patient continues to benefit from skilled PT services and is slowly progressing towards goals. Pt presented resting in recliner chair. Vitals taken as noted per flowsheet. Then when ready pt was assisted to standing using 2WW for support. She was able to walk out of the room and down past the nursing station w/ 2WW, flexed trunk posture and CGA needed to help ensure safety; no LOB, (+) SOB. Once sitting in recliner chair to rest O2 sats were rechecked yielding 96% O2 sats on RA. At end of session pt was left resting comfortably back in 
comfort and agreed to stay up in chair. She stood again with mod assist and ambulated another 35 feet in hallway. After therapy she was left sitting in chair at nurses station visiting with another patient.  Patient was asked several times without giving a clear answer as to what shoes she wears at home. It might be worth a try to have her wear more supportive shoes with arch support to see if that helps with foot pain.         PLAN:  Patient continues to benefit from skilled intervention to address the above impairments.  Continue treatment per established plan of care.    Recommendation for discharge: (in order for the patient to meet his/her long term goals): Therapy up to 5 days/week in Skilled nursing facility or Continue to assess pending progress    Other factors to consider for discharge: impaired cognition, high risk for falls, and not safe to be alone    IF patient discharges home will need the following DME: continuing to assess with progress       SUBJECTIVE:   Patient stated, \"My bottom hurts in this chair.\"    OBJECTIVE DATA SUMMARY:   Critical Behavior:  Orientation  Overall Orientation Status: Within Functional Limits  Orientation Level: Oriented X4       Functional Mobility Training:  Bed Mobility:     Transfers:  Transfer Training  Transfer Training: Yes  Overall Level of Assistance: Additional time;Moderate assistance  Interventions: Manual cues;Safety awareness training;Verbal cues  Sit to Stand: Moderate assistance;Additional time  Stand to Sit: Minimum assistance;Additional time;Assist X1  Balance:  Balance  Sitting: Impaired  Sitting - Static: Fair (occasional)  Sitting - Dynamic: Fair (occasional)  Standing: Impaired;With support  Standing - Static: Constant support;Fair  Standing - Dynamic: Constant support;Fair   Ambulation/Gait Training:     Gait  Gait Training: Yes  Left Side Weight Bearing: As tolerated  Right Side Weight Bearing: As tolerated  Overall Level of Assistance: Minimum 
pt out of the room using 2WW for support and she was able to slowly walk about half way across the nursing station; min A required for safety; flexed trunk; shuffled steps and cues to stay close to the AD to help reduce fall risk; no LOB but (+) SOB. When pt noted fatigue she was assisted back down into recliner chair to rest; she was rolled back to her room. At end of session, pt was eventually left resting comfortably in the recliner chair w/ B LE elevated, tray table and call bell in reach; chair alarm on and dtr still present in the room.         PLAN:  Patient continues to benefit from skilled intervention to address the above impairments.  Continue treatment per established plan of care.    Recommend with staff: therapy recommendations for staff: OOB to chair for all meals; assist pt to commode PRN w/ 2WW and staff assistance      Recommendation for discharge: (in order for the patient to meet his/her long term goals): Therapy up to 5 days/week in Skilled nursing facility or Continue to assess pending progress    Other factors to consider for discharge: impaired cognition, high risk for falls, not safe to be alone, and concern for safely navigating or managing the home environment    IF patient discharges home will need the following DME: rolling walker, wheelchair 18 inch, and continuing to assess with progress       SUBJECTIVE:   Patient stated, \"I was having a good nap.\"    OBJECTIVE DATA SUMMARY:   Critical Behavior:  Orientation  Overall Orientation Status: Within Functional Limits  Orientation Level: Oriented X4  Cognition  Overall Cognitive Status: WFL  Cognition Comment: mild to moderate cognitive/memory impairments noted    Functional Mobility Training:  Bed Mobility:  Bed Mobility Training  Bed Mobility Training: No  Overall Level of Assistance: Moderate assistance;Assist X1;Additional time  Transfers:  Transfer Training  Transfer Training: Yes  Overall Level of Assistance: Additional time;Assist 
clinical judgement;Increased time to complete          Balance:      Balance  Sitting: Intact  Standing: Intact;With support      ADL Assessment:     Feeding: Based on clinical judgement;Minimal assistance  Feeding Skilled Clinical Factors: has difficulty holding utensils  Grooming: Contact guard assistance  Grooming Skilled Clinical Factors: while sitting in chair  UE Bathing: Dependent/Total;Based on clinical judgement  UE Bathing Skilled Clinical Factors: baseline max A  LE Bathing: Based on clinical judgement;Dependent/Total  LE Bathing Skilled Clinical Factors: baseline max A  UE Dressing: Moderate assistance;Based on clinical judgement;Increased time to complete  LE Dressing: Maximum assistance;Based on clinical judgement;Increased time to complete  Toileting: Maximum assistance;Increased time to complete;Based on clinical judgement  Functional Mobility: Moderate assistance;Based on clinical judgement;Increased time to complete    ADL Intervention and task modifications:  Grooming while sitting in chair- CGA                                                                                                                                                                                                                                 Fugl-Ivey Assessment of Motor Recovery after Stroke:     Reflex Activity  Flexors/Biceps/Fingers: Can be elicited  Extensors/Triceps: Can be elicited  Reflex Subtotal: 4    Volitional Movement Within Synergies  Shoulder Retraction: Full  Shoulder Elevation: Full  Shoulder Abduction (90 degrees): Full  Shoulder External Rotation: Full  Elbow Flexion: Full  Forearm Supination: Full  Shoulder Adduction/Internal Rotation: Full  Elbow Extension: Full  Forearm Pronation: Full  Subtotal: 18    Volitional Movement Mixing Synergies  Hand to Lumbar Spine: Partial  Shoulder Flexion (0-90 degrees): Partial  Pronation-Supination: Partial  Subtotal: 3    Volitional Movement With Little or No

## 2024-06-04 VITALS
HEIGHT: 61 IN | OXYGEN SATURATION: 97 % | DIASTOLIC BLOOD PRESSURE: 70 MMHG | SYSTOLIC BLOOD PRESSURE: 119 MMHG | WEIGHT: 190 LBS | TEMPERATURE: 97.5 F | HEART RATE: 72 BPM | RESPIRATION RATE: 20 BRPM | BODY MASS INDEX: 35.87 KG/M2

## 2024-06-04 LAB
GLUCOSE BLD STRIP.AUTO-MCNC: 123 MG/DL (ref 65–117)
SERVICE CMNT-IMP: ABNORMAL

## 2024-06-04 PROCEDURE — 82962 GLUCOSE BLOOD TEST: CPT

## 2024-06-04 PROCEDURE — 6370000000 HC RX 637 (ALT 250 FOR IP): Performed by: INTERNAL MEDICINE

## 2024-06-04 PROCEDURE — 94760 N-INVAS EAR/PLS OXIMETRY 1: CPT

## 2024-06-04 PROCEDURE — G0378 HOSPITAL OBSERVATION PER HR: HCPCS

## 2024-06-04 RX ADMIN — ASPIRIN 81 MG: 81 TABLET, COATED ORAL at 08:26

## 2024-06-04 RX ADMIN — CEPHALEXIN 250 MG: 250 CAPSULE ORAL at 08:27

## 2024-06-04 RX ADMIN — TRIAMCINOLONE ACETONIDE: 1 OINTMENT TOPICAL at 08:41

## 2024-06-04 RX ADMIN — ACETAMINOPHEN 650 MG: 325 TABLET ORAL at 08:36

## 2024-06-04 RX ADMIN — METOPROLOL SUCCINATE 50 MG: 50 TABLET, EXTENDED RELEASE ORAL at 08:26

## 2024-06-04 RX ADMIN — NAPROXEN 250 MG: 250 TABLET ORAL at 08:26

## 2024-06-04 RX ADMIN — HYDROCORTISONE: 25 CREAM TOPICAL at 08:41

## 2024-06-04 RX ADMIN — FUROSEMIDE 40 MG: 40 TABLET ORAL at 08:27

## 2024-06-04 RX ADMIN — DILTIAZEM HYDROCHLORIDE 120 MG: 120 CAPSULE, EXTENDED RELEASE ORAL at 08:27

## 2024-06-04 NOTE — PROGRESS NOTES
Hospitalist Progress Note    NAME:   Elias Bhandari   : 6/3/1929   MRN: [id number]     Date/Time: 2024 9:40 AM  Patient PCP: Gracy Sr APRN - NP    Estimated discharge date:  Barriers:       Assessment / Plan:    Mild expressive aphasia  Resolved but still has some speech disturbance.    Mild trouble articulation.  No focal deficits noted.    MRI is negative for CVA.  Echocardiogram normal.     Generalized deconditioning:   Patient unable to care for herself needing a lot of assistance.  Recommended SNF/LTC per PT.     Mild hypokalemia:   Improved.     Diabetes mellitus type 2:   On 70/30 insulin at home.    Changed to glargine 10 units daily.    A1c of 6.8 noted.  Continue to monitor.     Essential hypertension:   On diltiazem and metoprolol with good control.  Cont' lasix.     Venous stasis dermatitis:   Chronic and will just raise lower extremities.     Varicella-zoster:   On valacyclovir, recommended total of 7 days.    Complete on .     Senile dementia    Code status:  DNR      Medical Decision Making:   I personally reviewed labs:  I personally reviewed imaging:  I personally reviewed EKG:  Toxic drug monitoring:   Discussed case with:     Subjective:     Chief Complaint / Reason for Physician Visit  \" Facial droop and speech difficulty\".  Discussed with RN events overnight.      - Admission H&P  89-year-old female admitted as a TIA workup due to speech disturbance and facial droop, has past history of cardiac issues with abnormal echocardiogram, chronic kidney disease, hypertension and COPD, diabetes mellitus.  Admitted for TIA workup as symptoms resolved, but due to long weekend unable to get the MRI head done.       Patient is alert and oriented x 1 only.  Slowly improving physical therapy with mild loose stools noted this a.m. only.  No overnight chest pain, shortness of breath, nausea vomiting, fever/chills.    Awaiting placement for SNF per case 
    Hospitalist Progress Note               Daily Progress Note: 5/28/2024      Hospital Day: 5     Chief complaint: Admission complaint aphasia with slurred speech.  No complaints of generalized weakness unable to ambulate    Subjective:   Hospital course to date:  89-year-old female admitted as a TIA workup due to speech disturbance and facial droop, has past history of cardiac issues with abnormal echocardiogram, chronic kidney disease, hypertension and COPD, diabetes mellitus.  Admitted for TIA workup as symptoms resolved, but due to long weekend unable to get the MRI head done.    --------  Patient is seen today for follow-up.  MRI of the head is done, showing chronic changes with no acute CVA.  Patient is evaluated with the physical therapy and Occupational Therapy, they think patient need skilled placement.    She is eating well, denies any chest pain or trouble breathing.  She understands communicates but not sure if she understands to find details of the information.        Medications reviewed  Current Facility-Administered Medications   Medication Dose Route Frequency    insulin glargine (LANTUS) injection vial 10 Units  10 Units SubCUTAneous Nightly    valACYclovir (VALTREX) tablet 1,000 mg  1,000 mg Oral Daily    acetaminophen (TYLENOL) tablet 650 mg  650 mg Oral BID PRN    metoprolol succinate (TOPROL XL) extended release tablet 50 mg  50 mg Oral Daily    miconazole (MICOTIN) 2 % powder   Topical BID    hydrocortisone (ANUSOL-HC) 2.5 % rectal cream   Rectal BID    aspirin EC tablet 81 mg  81 mg Oral Daily    [Held by provider] cetirizine (ZYRTEC) tablet 5 mg  5 mg Oral Daily    furosemide (LASIX) tablet 40 mg  40 mg Oral Daily    atorvastatin (LIPITOR) tablet 10 mg  10 mg Oral Daily    sodium chloride flush 0.9 % injection 5-40 mL  5-40 mL IntraVENous 2 times per day    sodium chloride flush 0.9 % injection 5-40 mL  5-40 mL IntraVENous PRN    0.9 % sodium chloride infusion   IntraVENous PRN    
    Hospitalist Progress Note               Daily Progress Note: 5/31/2024      Hospital Day: 8     Chief complaint: Right hip and leg pain with difficulty walking    89-year-old female admitted as a TIA workup due to speech disturbance and facial droop, has past history of cardiac issues with abnormal echocardiogram, chronic kidney disease, hypertension and COPD, diabetes mellitus.  Admitted for TIA workup as symptoms resolved, but due to long weekend unable to get the MRI head done.    --------  Patient is seen today for follow-up.  MRI of the head is done, showing chronic changes with no acute CVA.  Patient is evaluated with the physical therapy and Occupational Therapy, they think patient need skilled placement.    She is eating well, denies any chest pain or trouble breathing.  She understands communicates but not sure if she understands to find details of the information.    05/31/2024  : States she is doing fairly good.  But complains of pain when walking, majorly in the foot and right hip.  That is interfering with her gait.  Per physical therapy patient is willing to work with and they do not think it is complete weakness and pain is interfering.  Requested for x-rays of the knee, hip and foot.  Requested orthopedic consultation.  CT pelvis did not show any acute fractures, XR knee did not show significant issues at this time.  Foot first MTP joint severe arthritis, forefoot soft tissue swelling.    Medications reviewed  Current Facility-Administered Medications   Medication Dose Route Frequency    insulin lispro (HUMALOG,ADMELOG) injection vial 0-8 Units  0-8 Units SubCUTAneous TID WC    repaglinide (PRANDIN) tablet 0.5 mg  0.5 mg Oral Daily before dinner    insulin glargine (LANTUS) injection vial 10 Units  10 Units SubCUTAneous Nightly    valACYclovir (VALTREX) tablet 1,000 mg  1,000 mg Oral Daily    acetaminophen (TYLENOL) tablet 650 mg  650 mg Oral BID PRN    metoprolol succinate (TOPROL XL) extended 
  [facility]  Hospitalist Progress Note    NAME: Elias Bhandari   :  6/3/1929   MRN:  [id number]     Total duration of encounter: 2 days      Interim Hospital Summary: 89 y.o. female who presented on 2024 with TIA (transient ischemic attack). She has a past medical history of Abnormal echocardiogram, Asthma, Bilateral lower leg cellulitis, Bleeding hemorrhoids, Candida rash of groin, CKD (chronic kidney disease) stage 3, GFR 30-59 ml/min ([facility]), COPD (chronic obstructive pulmonary disease) ([facility]), HTN (hypertension), Pulmonary hypertension ([facility]), Renal mass, Type 2 diabetes mellitus ([facility]), and Venous stasis dermatitis..       Pt admitted with recent h/o L facial droop, slurring speech, problems with word finding.    Daughter is caregiver, having difficulty managing her care.  Interested in placement options.     Subjective:     Chief Complaint / Reason for Physician Visit  Patient had no new complaints overnight.  No new nursing concerns.    Review of Systems:  Symptom Y/N Comments  Symptom Y/N Comments   Fever/Chills n   Chest Pain n    Poor Appetite n   Edema y    Cough n   Abdominal Pain n    Sputum n   Joint Pain y    SOB/LENTZ n   Pruritis/Rash y  Tears L arm   Nausea/vomit n   Tolerating PT/OT ?  To begin   Diarrhea y   Tolerating Diet y    Constipation n   Other         Current Facility-Administered Medications:     valACYclovir (VALTREX) tablet 1,000 mg, 1,000 mg, Oral, Daily, Hugh Galaviz MD, 1,000 mg at 24 0941    acetaminophen (TYLENOL) tablet 650 mg, 650 mg, Oral, BID PRN, Hugh Galaviz MD    metoprolol succinate (TOPROL XL) extended release tablet 50 mg, 50 mg, Oral, Daily, Hugh Galaviz MD, 50 mg at 24 0943    miconazole (MICOTIN) 2 % powder, , Topical, BID, Hugh Galaviz MD, Given at 24 1018    hydrocortisone (ANUSOL-HC) 2.5 % rectal cream, , Rectal, BID, Hugh Galaviz MD, Given at 24 0945    aspirin EC tablet 81 mg, 81 mg, Oral, Daily, Hugh Galaviz 
  [facility]  Hospitalist Progress Note    NAME: Elias Bhandari   :  6/3/1929   MRN:  [id number]     Total duration of encounter: 3 days      Interim Hospital Summary: 89 y.o. female who presented on 2024 with TIA (transient ischemic attack). She has a past medical history of Abnormal echocardiogram, Asthma, Bilateral lower leg cellulitis, Bleeding hemorrhoids, Candida rash of groin, CKD (chronic kidney disease) stage 3, GFR 30-59 ml/min ([facility]), COPD (chronic obstructive pulmonary disease) ([facility]), HTN (hypertension), Pulmonary hypertension ([facility]), Renal mass, Type 2 diabetes mellitus ([facility]), and Venous stasis dermatitis..       Pt admitted with recent h/o L facial droop, slurring speech, problems with word finding.    Daughter is caregiver, having difficulty managing her care.  Interested in placement options.     Subjective:     Chief Complaint / Reason for Physician Visit  Patient had no new complaints overnight.  No new nursing concerns.    Review of Systems:  Symptom Y/N Comments  Symptom Y/N Comments   Fever/Chills n   Chest Pain n    Poor Appetite n   Edema y    Cough n   Abdominal Pain n    Sputum n   Joint Pain y    SOB/LENTZ n   Pruritis/Rash y  Tears L arm   Nausea/vomit n   Tolerating PT/OT ?  To begin   Diarrhea y   Tolerating Diet y    Constipation n   Other         Current Facility-Administered Medications:     valACYclovir (VALTREX) tablet 1,000 mg, 1,000 mg, Oral, Daily, Hugh Galaviz MD, 1,000 mg at 24    acetaminophen (TYLENOL) tablet 650 mg, 650 mg, Oral, BID PRN, Hugh Galaviz MD    metoprolol succinate (TOPROL XL) extended release tablet 50 mg, 50 mg, Oral, Daily, Hugh Galaviz MD, 50 mg at 24    miconazole (MICOTIN) 2 % powder, , Topical, BID, Hugh Galaviz MD, Given at 24    hydrocortisone (ANUSOL-HC) 2.5 % rectal cream, , Rectal, BID, Hugh Galaviz MD, Given at 24    aspirin EC tablet 81 mg, 81 mg, Oral, Daily, Hugh Galaviz 
  [facility]  Hospitalist Progress Note    NAME: Elias Bhandari   :  6/3/1929   MRN:  [id number]     Total duration of encounter: 8 days      Interim Hospital Summary: 89 y.o. female who presented on 2024 with TIA (transient ischemic attack). She has a past medical history of Abnormal echocardiogram, Asthma, Bilateral lower leg cellulitis, Bleeding hemorrhoids, Candida rash of groin, CKD (chronic kidney disease) stage 3, GFR 30-59 ml/min ([facility]), COPD (chronic obstructive pulmonary disease) (HCC), HTN (hypertension), Pulmonary hypertension (HCC), Renal mass, Type 2 diabetes mellitus (HCC), and Venous stasis dermatitis..       Pt admitted with recent h/o L facial droop, slurring speech, problems with word finding.  CT negative.  Pt tx for HTN, DM, Lipids.   Note low BS.  Pt admitted for Tele, MRI, ECHO  Daughter is caregiver, having difficulty managing her care.  Interested in placement options.     Subjective:     Chief Complaint / Reason for Physician Visit  \"No c/o\".  Discussed with RN   Chronic weakness L leg  Recent fall with L leg giving out - skin tears to L forearm  Has  and GI incontinance  Has multiple \"food intolerance\" - knows what she can eat  Most fruits - no oranges or apples  Like Apple sauce, bread, pancakes, gram crackers, grapes, bananas, strawberries, Cheerios  Dermatitis R arm PTA on tx Zoster, has continued itch / spread    Review of Systems:  Symptom Y/N Comments  Symptom Y/N Comments   Fever/Chills n   Chest Pain n    Poor Appetite n   Edema y    Cough n   Abdominal Pain n    Sputum n   Joint Pain y    SOB/LENTZ n   Pruritis/Rash y  Tears L arm   Nausea/vomit n   Tolerating PT/OT ?  To begin   Diarrhea y   Tolerating Diet y    Constipation n   Other         Current Facility-Administered Medications:     cephALEXin (KEFLEX) capsule 250 mg, 250 mg, Oral, 2 times per day, Hugh Galaviz MD    triamcinolone (KENALOG) 0.1 % cream, , Topical, TID, Hugh Galaviz MD    
  [facility]  Hospitalist Progress Note    NAME: Elias Bhandari   :  6/3/1929   MRN:  [id number]     Total duration of encounter: 9 days      Interim Hospital Summary: 89 y.o. female who presented on 2024 with TIA (transient ischemic attack). She has a past medical history of Abnormal echocardiogram, Asthma, Bilateral lower leg cellulitis, Bleeding hemorrhoids, Candida rash of groin, CKD (chronic kidney disease) stage 3, GFR 30-59 ml/min (HCC), COPD (chronic obstructive pulmonary disease) (HCC), HTN (hypertension), Pulmonary hypertension (HCC), Renal mass, Type 2 diabetes mellitus (HCC), and Venous stasis dermatitis..       Pt admitted with recent h/o L facial droop, slurring speech, problems with word finding.  CT negative.  Pt tx for HTN, DM, Lipids.   Note low BS.  Pt admitted for Tele, MRI, ECHO  Daughter is caregiver, having difficulty managing her care.  Interested in placement options.     Subjective:     Chief Complaint / Reason for Physician Visit  \"Good\".  Discussed with RN   Chronic weakness L leg  Recent fall with L leg giving out - skin tears to L forearm  Has  and GI incontinance  Has multiple \"food intolerance\" - knows what she can eat  Most fruits - no oranges or apples  Like Apple sauce, bread, pancakes, gram crackers, grapes, bananas, strawberries, Cheerios  Dermatitis R arm PTA on tx Zoster, has continued itch / spread.  Tx for dermititis    Review of Systems:  Symptom Y/N Comments  Symptom Y/N Comments   Fever/Chills n   Chest Pain n    Poor Appetite n   Edema y    Cough n   Abdominal Pain n    Sputum n   Joint Pain y    SOB/LENTZ n   Pruritis/Rash y  Tears L arm, papules R   Nausea/vomit n   Tolerating PT/OT ?  To begin   Diarrhea n   Tolerating Diet y    Constipation n   Other         Current Facility-Administered Medications:     cephALEXin (KEFLEX) capsule 250 mg, 250 mg, Oral, 2 times per day, Hugh Galaviz MD, 250 mg at 24 0919    triamcinolone (KENALOG) 0.1 % 
0825 Blood sugar 64 at this time; patient just started to eat breakfast. Will recheck in 20 minutes. MD aware. Will hold PM 10 U Lantus  0904 Blood sugar 82; Cranberry juice offered, patient accepted. Will recheck in 15 minutes  1002 Blood sugar 138    
1st IM Letter from Medicare explained to patient and patient's daughter with an opportunity for questions provided. Signed document placed on paper chart, copy given to patient's daughter.  
Called and spoke with Dr Hurt he will see patient on 6/1  
Discharge Summary    Elias Bhandari  :  6/3/1929  MRN:  [id number]    ADMIT DATE:  2024  DISCHARGE DATE:  2024      Discharge instruction reviewed with patient and daughter.    Home med's returned Yes    Personal belongings returned Yes    Patient Wheeled to front entrance via wheelchair with Nurse.    Verbal report given to Sarmad at [facility]. Patient transported with daughter and personal vehicle. All LDAs removed. All questions/concerns addressed. This nurse and accompaning orientee moved patient in car safely; en route to [facility] at 1135.    SIGNED:    Michele Luna RN       
Hospitalist Progress    D/c to SNF was not completed yesterday  Family here now to take her to [facility]    Some response to R hand arthritis to Naprosyn  L UE abrasion have improved and dressings are stopped    Daughter and Son in Law were here for d/c  Answered questions and concerns  They are planning for long term care needs following rehab  Pt may have some VA benefits to assist with personal care in home  
Pharmacy  Dosing    Indication for Antimicrobials: Zoster  Current Regimen of Each Antimicrobial (Start Day & Day of Therapy):  Start date - PTA started  or   Valacyclovir 500 mg Q 12 dose adjusted to 1 gm Q 24 hours - renal adjustment        Significant Cultures:      Paralysis, amputations:   Recent Labs     24  1518 24  0643   CREATININE 1.99* 1.91*   BUN 33* 29*   WBC 6.5 7.6     Temp (24hrs), Av.7 °F (36.5 °C), Min:96.8 °F (36 °C), Max:98.9 °F (37.2 °C)    Creatinine Clearance (Creatinine Clearance (ml/min)): 18 ml/min     Impression/Plan: Patient in ED w/ stroke symptoms. Also has shingles and started on medication as outpatient.  Renal function = 18 ml/min  Valacyclovir dose adjusted for renal function to 1 gm Q 24 hours.         Pharmacy will follow daily and adjust medications as appropriate for renal function and/or serum levels.    Thank you,  Joanne Tsai [facility]     Renal Dosing Tables on Pharmweb    
Spiritual Care Assessment/Progress Note  [facility]    Name: Elias Bhandari MRN: [id number]    Age: 89 y.o.     Sex: female   Language: English     Date: 5/27/2024            Total Time Calculated: 89 min              Spiritual Assessment begun in [facility] MED/SURG  Service Provided For: Patient  Referral/Consult From: Rounding  Encounter Overview/Reason: Initial Encounter    Spiritual beliefs:      [x] Involved in a marquita tradition/spiritual practice:      [] Supported by a marquita community:      [] Claims no spiritual orientation:      [] Seeking spiritual identity:           [] Adheres to an individual form of spirituality:      [] Not able to assess:                Identified resources for coping and support system:   Support System: Children (Daughter and Son-in-Law)       [x] Prayer                  [] Devotional reading               [] Music                  [] Guided Imagery     [] Pet visits                                        [] Other: (COMMENT)     Specific area/focus of visit   Encounter:  Iniitial  Crisis:    Spiritual/Emotional needs: Type: Spiritual Support  Ritual, Rites and Sacraments:    Grief, Loss, and Adjustments:    Ethics/Mediation:    Behavioral Health:    Palliative Care:    Advance Care Planning:           Narrative: The  was rounding on the Med/Surg. The Patient was in her room alone and had on a hospital gown and was sitting in a recliner with a blanket over her legs. There was a Word Search on her serving table which she had been working on.The room was well lit. The  explained the services offered by the  Ministry here at [facility].  The Patient was engaged in conversation as she reflected on her life experiences. The  listened attentively, provided Patient with a book tarun, and provided a Novice. The  did not advise of the availability of the her services because the Patient is scheduled to be discharged today.  
  Weight - Scale 190 lbs 190 lbs 144 lbs 179 lbs 181 lbs 177 lbs 176 lbs 3 oz   Weight - Scale 86.2 kg 86.2 kg 65.3 kg 81.2 kg 82.1 kg 80.3 kg 79.9 kg   Weight Method      Stated        Estimated Daily Nutrient Needs:  Energy Requirements Based On: Formula  Weight Used for Energy Requirements: Current  Energy (kcal/day): 1440  Weight Used for Protein Requirements: Current  Protein (g/day): 86  Method Used for Fluid Requirements: 1 ml/kcal  Fluid (ml/day): 1440    Nutrition Diagnosis:   Altered nutrition-related lab values related to endocrine dysfuntion as evidenced by lab values    Nutrition Interventions:   Food and/or Nutrient Delivery: Modify Current Diet  Nutrition Education/Counseling: No recommendation at this time  Coordination of Nutrition Care: Interdisciplinary Rounds  Plan of Care discussed with: pt    Goals:     Goals: Meet at least 75% of estimated needs, by next RD assessment       Nutrition Monitoring and Evaluation:   Behavioral-Environmental Outcomes: None Identified  Food/Nutrient Intake Outcomes: Food and Nutrient Intake  Physical Signs/Symptoms Outcomes: Meal Time Behavior, Biochemical Data, Weight    Discharge Planning:    Continue current diet     Nicky Plaza RD      
  PHOS  --  3.1       Signed: SHAHAB GUEVARA MD         
effect. The basilar cisterns are open. No CT evidence of acute infarct.   The bone windows demonstrate no abnormalities. The visualized portions of the  paranasal sinuses and mastoid air cells are clear. Bilateral lens replacements.   IMPRESSION: No acute abnormality.     EKG 5/24:  Aflutter variable block HR 67,  LAD, Low Voltage, RBBB, PRWP, ABN, Since 5/17/24  Note NSTWC Inf, no L ant fascicular block, QT has lengthened        Procedures: see electronic medical records for all procedures/Xrays and details which were not copied into this note but were reviewed prior to creation of Plan.        Assessment / Plan:  Principal Problem:    TIA (transient ischemic attack)  Active Problems:    Paroxysmal atrial fibrillation (HCC)  Pt was in Afib / flutter in ED 5/17 and again today  Has been off Warfarin due to fall hx and GI bleeding  Monitor on ASA  Reduced Metoprolol XR 25mg - resumed 50mg with elevated BP  Cont Diltiazem XR 120mg daily  Cont Lisinopril  Plan ECHO / MRI - likely not available till Tueday with this being Holiday Weekend (Family made aware)  Neuro Checks q4h x 24h  PT/OT  DCP for consideration of placement options / finances       HLD (hyperlipidemia)  Panel noted for LDL only 16, will hold Lipitor and follow off with PCP  Pt has limited fatty foods on her recent restricted diet       Type 2 diabetes mellitus with chronic kidney disease (HCC)  Concern with Hypoglycemia a/w Neuro deficits  Hold current Novulin 70/30 12 units  Monitor BS qid ac/hs  CC low schedule Humalog       Primary hypertension  Reduced Metoprolol XR 25mg daily --> resume 50mg  Cont Diltiazem XR 120mg daily  Cont Lisinopril 20mg daily       Venous stasis dermatitis  LE elevation       Zoster  Clarify specifics - daughter notes was started 2 days ago for pruritic vesicular rash on R deltoid area  Cont Valacyclovir 500mg bid - Pharmacy suggests 1g daily, maintain 7d        SAFETY:   Code Status:DNR  DVT prophylaxis: Lovenox  Stress

## 2024-06-04 NOTE — CARE COORDINATION
06/04/24 0840   Services At/After Discharge   Transition of Care Consult (CM Consult) SNF   Partner SNF No   Reason Why Partner SNF Not Chosen Bed availability   Services At/After Discharge Skilled Nursing Facility (SNF)   [place] Resource Information Provided? No   Mode of Transport at Discharge Self   Confirm Follow Up Transport Family   Condition of Participation: Discharge Planning   The Plan for Transition of Care is related to the following treatment goals: Gain strength and endurance prior to going home--SNF at [facility].   The Patient and/or Patient Representative was provided with a Choice of Provider? Patient   The Patient and/Or Patient Representative agree with the Discharge Plan? Yes   Freedom of Choice list was provided with basic dialogue that supports the patient's individualized plan of care/goals, treatment preferences, and shares the quality data associated with the providers?  Yes     Ms. Bhandari is being discharged today to [facility] for skilled care. Goal is to gain strength and endurance prior to going home. Patient/family aware and agrees with dc plan. They are aware to contact CM for any questions or concerns even after discharge.     Transition of Care Plan:     RUR: 17% MODERATE   Prior Level of Functioning: req assistance   Disposition: SNF at [facility]   If SNF or IPR: Date FOC offered:   Date FOC received:   Accepting facility:   Date authorization started with reference number:   Date authorization received and expires:   Follow up appointments:   DME needed:   Transportation at discharge:   IM/IMM Medicare/ letter given: 1st IMM 5/25, 2nd 6/4 IMM obtained   Is patient a  and connected with VA?               If yes, was [place] transfer form completed and VA notified?   Caregiver Contact:   Discharge Caregiver contacted prior to discharge?   Care Conference needed?   Barriers to discharge: None     Transition of Care Plan to SNF/Rehab    Communication to

## 2024-07-09 RX ORDER — REPAGLINIDE 0.5 MG/1
TABLET ORAL
Qty: 90 TABLET | Refills: 0 | Status: SHIPPED | OUTPATIENT
Start: 2024-07-09

## 2024-07-09 NOTE — TELEPHONE ENCOUNTER
Patient requesting refill on     Requested Prescriptions     Pending Prescriptions Disp Refills    repaglinide (PRANDIN) 0.5 MG tablet [Pharmacy Med Name: REPAGLINIDE 0.5MG TABLETS] 90 tablet      Sig: TAKE 1 TABLET BY MOUTH EVERY AFTERNOON        Last OV 5/21/2024

## 2024-07-15 ENCOUNTER — OFFICE VISIT (OUTPATIENT)
Age: 89
End: 2024-07-15
Payer: MEDICARE

## 2024-07-15 VITALS
OXYGEN SATURATION: 96 % | DIASTOLIC BLOOD PRESSURE: 82 MMHG | BODY MASS INDEX: 35.9 KG/M2 | HEIGHT: 61 IN | RESPIRATION RATE: 18 BRPM | SYSTOLIC BLOOD PRESSURE: 136 MMHG | HEART RATE: 86 BPM | TEMPERATURE: 97.4 F

## 2024-07-15 DIAGNOSIS — I48.0 PAROXYSMAL ATRIAL FIBRILLATION (HCC): ICD-10-CM

## 2024-07-15 DIAGNOSIS — L30.9 DERMATITIS: ICD-10-CM

## 2024-07-15 DIAGNOSIS — E78.2 MIXED HYPERLIPIDEMIA: ICD-10-CM

## 2024-07-15 DIAGNOSIS — B37.2 CANDIDAL INTERTRIGO: ICD-10-CM

## 2024-07-15 DIAGNOSIS — E11.22 TYPE 2 DIABETES MELLITUS WITH STAGE 3A CHRONIC KIDNEY DISEASE, WITH LONG-TERM CURRENT USE OF INSULIN (HCC): Primary | ICD-10-CM

## 2024-07-15 DIAGNOSIS — Z86.73 HX-TIA (TRANSIENT ISCHEMIC ATTACK): ICD-10-CM

## 2024-07-15 DIAGNOSIS — I10 ESSENTIAL (PRIMARY) HYPERTENSION: ICD-10-CM

## 2024-07-15 DIAGNOSIS — N18.31 TYPE 2 DIABETES MELLITUS WITH STAGE 3A CHRONIC KIDNEY DISEASE, WITH LONG-TERM CURRENT USE OF INSULIN (HCC): Primary | ICD-10-CM

## 2024-07-15 DIAGNOSIS — N18.4 STAGE 4 CHRONIC KIDNEY DISEASE (HCC): ICD-10-CM

## 2024-07-15 DIAGNOSIS — Z79.4 TYPE 2 DIABETES MELLITUS WITH STAGE 3A CHRONIC KIDNEY DISEASE, WITH LONG-TERM CURRENT USE OF INSULIN (HCC): Primary | ICD-10-CM

## 2024-07-15 PROCEDURE — 99215 OFFICE O/P EST HI 40 MIN: CPT

## 2024-07-15 PROCEDURE — 36415 COLL VENOUS BLD VENIPUNCTURE: CPT

## 2024-07-15 PROCEDURE — 3044F HG A1C LEVEL LT 7.0%: CPT

## 2024-07-15 PROCEDURE — 1123F ACP DISCUSS/DSCN MKR DOCD: CPT

## 2024-07-15 RX ORDER — DILTIAZEM HYDROCHLORIDE 120 MG/1
120 CAPSULE, EXTENDED RELEASE ORAL
COMMUNITY
Start: 2024-07-01 | End: 2024-07-15 | Stop reason: SDUPTHER

## 2024-07-15 RX ORDER — LANCETS 30 GAUGE
1 EACH MISCELLANEOUS DAILY
Qty: 100 EACH | Refills: 5 | Status: SHIPPED | OUTPATIENT
Start: 2024-07-15

## 2024-07-16 PROBLEM — Z86.73 HISTORY OF STROKE: Status: ACTIVE | Noted: 2024-07-16

## 2024-07-16 LAB
ALBUMIN SERPL-MCNC: 3.1 G/DL (ref 3.5–5)
ALBUMIN/GLOB SERPL: 0.9 (ref 1.1–2.2)
ALP SERPL-CCNC: 138 U/L (ref 45–117)
ALT SERPL-CCNC: 14 U/L (ref 12–78)
ANION GAP SERPL CALC-SCNC: 8 MMOL/L (ref 5–15)
AST SERPL-CCNC: 16 U/L (ref 15–37)
BASOPHILS # BLD: 0.1 K/UL (ref 0–0.1)
BASOPHILS NFR BLD: 1 % (ref 0–1)
BILIRUB SERPL-MCNC: 0.8 MG/DL (ref 0.2–1)
BUN SERPL-MCNC: 19 MG/DL (ref 6–20)
BUN/CREAT SERPL: 12 (ref 12–20)
CALCIUM SERPL-MCNC: 9 MG/DL (ref 8.5–10.1)
CHLORIDE SERPL-SCNC: 106 MMOL/L (ref 97–108)
CHOLEST SERPL-MCNC: 120 MG/DL
CO2 SERPL-SCNC: 30 MMOL/L (ref 21–32)
CREAT SERPL-MCNC: 1.61 MG/DL (ref 0.55–1.02)
DIFFERENTIAL METHOD BLD: ABNORMAL
EOSINOPHIL # BLD: 0.2 K/UL (ref 0–0.4)
EOSINOPHIL NFR BLD: 3 % (ref 0–7)
ERYTHROCYTE [DISTWIDTH] IN BLOOD BY AUTOMATED COUNT: 16.6 % (ref 11.5–14.5)
GLOBULIN SER CALC-MCNC: 3.5 G/DL (ref 2–4)
GLUCOSE SERPL-MCNC: 144 MG/DL (ref 65–100)
HCT VFR BLD AUTO: 39.6 % (ref 35–47)
HDLC SERPL-MCNC: 44 MG/DL
HDLC SERPL: 2.7 (ref 0–5)
HGB BLD-MCNC: 11.9 G/DL (ref 11.5–16)
IMM GRANULOCYTES # BLD AUTO: 0.1 K/UL (ref 0–0.04)
IMM GRANULOCYTES NFR BLD AUTO: 1 % (ref 0–0.5)
LDLC SERPL CALC-MCNC: 59.2 MG/DL (ref 0–100)
LYMPHOCYTES # BLD: 0.7 K/UL (ref 0.8–3.5)
LYMPHOCYTES NFR BLD: 12 % (ref 12–49)
MAGNESIUM SERPL-MCNC: 2 MG/DL (ref 1.6–2.4)
MCH RBC QN AUTO: 29.8 PG (ref 26–34)
MCHC RBC AUTO-ENTMCNC: 30.1 G/DL (ref 30–36.5)
MCV RBC AUTO: 99.2 FL (ref 80–99)
MONOCYTES # BLD: 0.5 K/UL (ref 0–1)
MONOCYTES NFR BLD: 9 % (ref 5–13)
NEUTS SEG # BLD: 4.5 K/UL (ref 1.8–8)
NEUTS SEG NFR BLD: 74 % (ref 32–75)
NRBC # BLD: 0 K/UL (ref 0–0.01)
NRBC BLD-RTO: 0 PER 100 WBC
PLATELET # BLD AUTO: 223 K/UL (ref 150–400)
PLATELET COMMENT: ABNORMAL
PMV BLD AUTO: 12.3 FL (ref 8.9–12.9)
POTASSIUM SERPL-SCNC: 3.9 MMOL/L (ref 3.5–5.1)
PROT SERPL-MCNC: 6.6 G/DL (ref 6.4–8.2)
RBC # BLD AUTO: 3.99 M/UL (ref 3.8–5.2)
RBC MORPH BLD: ABNORMAL
SODIUM SERPL-SCNC: 144 MMOL/L (ref 136–145)
TRIGL SERPL-MCNC: 84 MG/DL
VLDLC SERPL CALC-MCNC: 16.8 MG/DL
WBC # BLD AUTO: 6.1 K/UL (ref 3.6–11)

## 2024-07-16 ASSESSMENT — ENCOUNTER SYMPTOMS
RESPIRATORY NEGATIVE: 1
CONSTIPATION: 0
SHORTNESS OF BREATH: 0
BLOOD IN STOOL: 0
EYES NEGATIVE: 1
COUGH: 0
DIARRHEA: 0
WHEEZING: 0
ALLERGIC/IMMUNOLOGIC NEGATIVE: 1

## 2024-07-16 NOTE — PROGRESS NOTES
\"Have you been to the ER, urgent care clinic since your last visit?  Hospitalized since your last visit?\"    NO    “Have you seen or consulted any other health care providers outside of [facility] since your last visit?”    NO    Chief Complaint   Patient presents with    Follow-up       Vitals:    07/15/24 0931   BP: 136/82   Pulse: 86   Resp: 18   Temp: 97.4 °F (36.3 °C)   SpO2: 96%       Labs drawn from right arm per Gracy Sr NP's orders. Patient tolerated well.    Click Here for Release of Records Request    
place, and time.   Psychiatric:         Mood and Affect: Mood normal.         Behavior: Behavior normal.         Thought Content: Thought content normal.         Judgment: Judgment normal.        ASSESSMENT AND PLAN:     1. Type 2 diabetes mellitus with stage 3a chronic kidney disease, with long-term current use of insulin (HCC)  -     HI COLLECTION VENOUS BLOOD VENIPUNCTURE  -     CBC with Auto Differential; Future  -     Comprehensive Metabolic Panel; Future  -     Lipid Panel; Future  -     Magnesium; Future  -     Lancets MISC; DAILY Starting Mon 7/15/2024, Disp-100 each, R-5, Normal  2. Essential (primary) hypertension  -     HI COLLECTION VENOUS BLOOD VENIPUNCTURE  -     CBC with Auto Differential; Future  -     Comprehensive Metabolic Panel; Future  -     Lipid Panel; Future  -     Magnesium; Future  -     Lancets MISC; DAILY Starting Mon 7/15/2024, Disp-100 each, R-5, Normal  3. Paroxysmal atrial fibrillation (HCC)  -     HI COLLECTION VENOUS BLOOD VENIPUNCTURE  -     CBC with Auto Differential; Future  -     Comprehensive Metabolic Panel; Future  -     Magnesium; Future  4. Stage 4 chronic kidney disease (HCC)  -     HI COLLECTION VENOUS BLOOD VENIPUNCTURE  -     CBC with Auto Differential; Future  -     Comprehensive Metabolic Panel; Future  -     Magnesium; Future  5. Mixed hyperlipidemia  -     HI COLLECTION VENOUS BLOOD VENIPUNCTURE  -     Lipid Panel; Future  6. Hx-TIA (transient ischemic attack)  7. Candidal intertrigo       Continue Prandin and home monitoring; consider adding additional oral agents if needed for control.  Normotensive today in office; continue current regimen without changes.  Afib rate controlled; continue diltiazem, metoprolol, ASA.  Will continue to monitor cholesterol off statin; continue to follow heart healthy diet.    No neurological symptoms following discharge; continue daily ASA 81mg, continue PT for improved mobility.      Medication Side Effects and Warnings were

## 2024-07-18 ENCOUNTER — TELEPHONE (OUTPATIENT)
Age: 89
End: 2024-07-18

## 2024-07-18 NOTE — TELEPHONE ENCOUNTER
Elias saw Gracy yesterday and Bettie said she still has a lot of congestion in her throat, now having dry heaves and feeling horrible. Has only eaten a few crackers. She never started feeling this way until after PT yesterday. Bettie wants to know what she can give Elias to help with the dry heaves. Would like someone to give her a call today instead of waiting for Gracy tomorrow.

## 2024-07-19 ENCOUNTER — APPOINTMENT (OUTPATIENT)
Facility: HOSPITAL | Age: 89
End: 2024-07-19
Payer: MEDICARE

## 2024-07-19 ENCOUNTER — HOSPITAL ENCOUNTER (EMERGENCY)
Facility: HOSPITAL | Age: 89
Discharge: HOME | End: 2024-07-19
Attending: EMERGENCY MEDICINE
Payer: MEDICARE

## 2024-07-19 VITALS
HEART RATE: 63 BPM | RESPIRATION RATE: 16 BRPM | OXYGEN SATURATION: 97 % | SYSTOLIC BLOOD PRESSURE: 158 MMHG | DIASTOLIC BLOOD PRESSURE: 75 MMHG | TEMPERATURE: 98.3 F

## 2024-07-19 DIAGNOSIS — R07.89 CHEST WALL PAIN: Primary | ICD-10-CM

## 2024-07-19 LAB
ALBUMIN SERPL-MCNC: 3 G/DL (ref 3.5–5)
ALBUMIN/GLOB SERPL: 0.8 (ref 1.1–2.2)
ALP SERPL-CCNC: 149 U/L (ref 45–117)
ALT SERPL-CCNC: 16 U/L (ref 12–78)
ANION GAP SERPL CALC-SCNC: 10 MMOL/L (ref 5–15)
AST SERPL-CCNC: 13 U/L (ref 15–37)
BASOPHILS # BLD: 0.1 K/UL (ref 0–0.1)
BASOPHILS NFR BLD: 1 % (ref 0–1)
BILIRUB SERPL-MCNC: 1.4 MG/DL (ref 0.2–1)
BUN SERPL-MCNC: 15 MG/DL (ref 6–20)
BUN/CREAT SERPL: 12 (ref 12–20)
CALCIUM SERPL-MCNC: 9 MG/DL (ref 8.5–10.1)
CHLORIDE SERPL-SCNC: 102 MMOL/L (ref 97–108)
CO2 SERPL-SCNC: 28 MMOL/L (ref 21–32)
CREAT SERPL-MCNC: 1.29 MG/DL (ref 0.55–1.02)
DIFFERENTIAL METHOD BLD: ABNORMAL
EKG ATRIAL RATE: 74 BPM
EKG DIAGNOSIS: NORMAL
EKG Q-T INTERVAL: 480 MS
EKG QRS DURATION: 128 MS
EKG QTC CALCULATION (BAZETT): 532 MS
EKG R AXIS: -42 DEGREES
EKG T AXIS: 8 DEGREES
EKG VENTRICULAR RATE: 74 BPM
EOSINOPHIL # BLD: 0 K/UL (ref 0–0.4)
EOSINOPHIL NFR BLD: 0 % (ref 0–7)
ERYTHROCYTE [DISTWIDTH] IN BLOOD BY AUTOMATED COUNT: 15.8 % (ref 11.5–14.5)
GLOBULIN SER CALC-MCNC: 3.9 G/DL (ref 2–4)
GLUCOSE SERPL-MCNC: 159 MG/DL (ref 65–100)
HCT VFR BLD AUTO: 38.9 % (ref 35–47)
HGB BLD-MCNC: 12.4 G/DL (ref 11.5–16)
IMM GRANULOCYTES # BLD AUTO: 0 K/UL (ref 0–0.04)
IMM GRANULOCYTES NFR BLD AUTO: 0 % (ref 0–0.5)
LYMPHOCYTES # BLD: 1 K/UL (ref 0.8–3.5)
LYMPHOCYTES NFR BLD: 11 % (ref 12–49)
MCH RBC QN AUTO: 29.2 PG (ref 26–34)
MCHC RBC AUTO-ENTMCNC: 31.9 G/DL (ref 30–36.5)
MCV RBC AUTO: 91.5 FL (ref 80–99)
MONOCYTES # BLD: 0.7 K/UL (ref 0–1)
MONOCYTES NFR BLD: 8 % (ref 5–13)
NEUTS SEG # BLD: 7.6 K/UL (ref 1.8–8)
NEUTS SEG NFR BLD: 80 % (ref 32–75)
NRBC # BLD: 0 K/UL (ref 0–0.01)
NRBC BLD-RTO: 0 PER 100 WBC
PLATELET # BLD AUTO: 233 K/UL (ref 150–400)
PMV BLD AUTO: 11.9 FL (ref 8.9–12.9)
POTASSIUM SERPL-SCNC: 3.9 MMOL/L (ref 3.5–5.1)
PROT SERPL-MCNC: 6.9 G/DL (ref 6.4–8.2)
RBC # BLD AUTO: 4.25 M/UL (ref 3.8–5.2)
SODIUM SERPL-SCNC: 140 MMOL/L (ref 136–145)
TROPONIN I SERPL HS-MCNC: 12 NG/L (ref 0–51)
WBC # BLD AUTO: 9.5 K/UL (ref 3.6–11)

## 2024-07-19 PROCEDURE — 36600 WITHDRAWAL OF ARTERIAL BLOOD: CPT

## 2024-07-19 PROCEDURE — 36415 COLL VENOUS BLD VENIPUNCTURE: CPT

## 2024-07-19 PROCEDURE — 99285 EMERGENCY DEPT VISIT HI MDM: CPT

## 2024-07-19 PROCEDURE — 71045 X-RAY EXAM CHEST 1 VIEW: CPT

## 2024-07-19 PROCEDURE — 80053 COMPREHEN METABOLIC PANEL: CPT

## 2024-07-19 PROCEDURE — 84484 ASSAY OF TROPONIN QUANT: CPT

## 2024-07-19 PROCEDURE — 93005 ELECTROCARDIOGRAM TRACING: CPT | Performed by: EMERGENCY MEDICINE

## 2024-07-19 PROCEDURE — 85025 COMPLETE CBC W/AUTO DIFF WBC: CPT

## 2024-07-19 RX ORDER — ONDANSETRON 4 MG/1
4 TABLET, ORALLY DISINTEGRATING ORAL 3 TIMES DAILY PRN
Qty: 21 TABLET | Refills: 0 | Status: SHIPPED | OUTPATIENT
Start: 2024-07-19

## 2024-07-19 ASSESSMENT — PAIN - FUNCTIONAL ASSESSMENT: PAIN_FUNCTIONAL_ASSESSMENT: 0-10

## 2024-07-19 ASSESSMENT — PAIN DESCRIPTION - PAIN TYPE: TYPE: ACUTE PAIN

## 2024-07-19 ASSESSMENT — PAIN DESCRIPTION - ORIENTATION: ORIENTATION: LEFT

## 2024-07-19 ASSESSMENT — PAIN DESCRIPTION - FREQUENCY: FREQUENCY: CONTINUOUS

## 2024-07-19 ASSESSMENT — PAIN SCALES - GENERAL: PAINLEVEL_OUTOF10: 8

## 2024-07-19 ASSESSMENT — PAIN DESCRIPTION - LOCATION: LOCATION: SHOULDER

## 2024-07-19 NOTE — ED PROVIDER NOTES
[facility] EMERGENCY DEP  EMERGENCY DEPARTMENT ENCOUNTER       Pt Name: Elias Bhandari  MRN: [id number]  Birthdate 6/3/1929  Date of evaluation: 7/19/2024  Provider: Cammie Shields MD   PCP: Gracy Sr APRN - NP  Note Started: 2:58 PM EDT 7/19/24     CHIEF COMPLAINT       Chief Complaint   Patient presents with    Shoulder Pain        HISTORY OF PRESENT ILLNESS: 1 or more elements      History From: patient and daughter, History limited by: none     Elias Bhandari is a 89 y.o. female presents to the Emergency Department for chest wall pain.  Daughter reports the patient also had an episode of vomiting/dry heaves this morning.  No diarrhea.       Please See MDM for Additional Details of the HPI/PMH  Nursing Notes were all reviewed and agreed with or any disagreements were addressed in the HPI.     REVIEW OF SYSTEMS        Positives and Pertinent negatives as per HPI.    PAST HISTORY     Past Medical History:  Past Medical History:   Diagnosis Date    Abnormal echocardiogram 2016    New A-fib    Asthma     Bilateral lower leg cellulitis 2017    Bleeding hemorrhoids 2017    Candida rash of groin 2021    CKD (chronic kidney disease) stage 3, GFR 30-59 ml/min ([facility]) 2017    COPD (chronic obstructive pulmonary disease) ([facility])     HTN (hypertension) 2015    Pulmonary hypertension ([facility])     Renal mass 2019    Type 2 diabetes mellitus (HCC) 2019    Venous stasis dermatitis 2017       Past Surgical History:  History reviewed. No pertinent surgical history.    Family History:  History reviewed. No pertinent family history.    Social History:  Social History     Tobacco Use    Smoking status: Never    Smokeless tobacco: Never   Substance Use Topics    Alcohol use: Never    Drug use: Never       Allergies:  Allergies   Allergen Reactions    Lactose Intolerance (Gi) Diarrhea    Acetaminophen Other (See Comments)     Patient tolerates acetaminophen  Unknown reason why listed    Adhesive Tape     Penicillins Hives    Sulfa

## 2024-07-19 NOTE — ED NOTES
Pt to be discharged, pt daughter assisting pt with walking to restroom at this time. Pt appears in no distress.

## 2024-07-19 NOTE — ED NOTES
Pt placed on cardiac monitoring per ED Provider at this time. Pt daughter at bedside and pt gowned. RT called for arterial stick for pt labs as they were unable to be obtained x 3 earlier. Per ED Provider, peripheral access deferred at this time.

## 2024-07-19 NOTE — ED NOTES
I have reviewed discharge instructions with the patient and caregiver. The patient and caregiver verbalized understanding. Discharge medications discussed with patient. No questions at this time. Wheeled to daughter's car without difficulty.

## 2024-07-19 NOTE — ED TRIAGE NOTES
After physical therapy this week with resistance band and shoulder exercises over her head, pt reports pain with movement of her left arm that radiates into chest, much worse with movement. No SOB, no n/v/d, no dizziness. Pt normally in bed at home.    Pt educated on ED Acuity/Flow, returned to waiting room at this time per charge RN.

## 2024-07-22 RX ORDER — METOPROLOL SUCCINATE 50 MG/1
50 TABLET, EXTENDED RELEASE ORAL DAILY
Qty: 90 TABLET | Refills: 1 | Status: SHIPPED | OUTPATIENT
Start: 2024-07-22

## 2024-07-22 RX ORDER — FUROSEMIDE 20 MG/1
TABLET ORAL
Qty: 180 TABLET | Refills: 1 | Status: SHIPPED | OUTPATIENT
Start: 2024-07-22

## 2024-10-15 ENCOUNTER — OFFICE VISIT (OUTPATIENT)
Age: 89
End: 2024-10-15
Payer: MEDICARE

## 2024-10-15 VITALS
WEIGHT: 153 LBS | OXYGEN SATURATION: 98 % | DIASTOLIC BLOOD PRESSURE: 76 MMHG | RESPIRATION RATE: 18 BRPM | SYSTOLIC BLOOD PRESSURE: 124 MMHG | HEART RATE: 89 BPM | TEMPERATURE: 97.9 F | BODY MASS INDEX: 28.91 KG/M2

## 2024-10-15 DIAGNOSIS — N18.4 STAGE 4 CHRONIC KIDNEY DISEASE (HCC): ICD-10-CM

## 2024-10-15 DIAGNOSIS — Z00.00 MEDICARE ANNUAL WELLNESS VISIT, SUBSEQUENT: Primary | ICD-10-CM

## 2024-10-15 DIAGNOSIS — N18.4 TYPE 2 DIABETES MELLITUS WITH STAGE 4 CHRONIC KIDNEY DISEASE, WITH LONG-TERM CURRENT USE OF INSULIN (HCC): ICD-10-CM

## 2024-10-15 DIAGNOSIS — I10 ESSENTIAL (PRIMARY) HYPERTENSION: ICD-10-CM

## 2024-10-15 DIAGNOSIS — E11.22 TYPE 2 DIABETES MELLITUS WITH STAGE 4 CHRONIC KIDNEY DISEASE, WITH LONG-TERM CURRENT USE OF INSULIN (HCC): ICD-10-CM

## 2024-10-15 DIAGNOSIS — K62.3 PARTIAL RECTAL PROLAPSE: ICD-10-CM

## 2024-10-15 DIAGNOSIS — Z79.4 TYPE 2 DIABETES MELLITUS WITH STAGE 4 CHRONIC KIDNEY DISEASE, WITH LONG-TERM CURRENT USE OF INSULIN (HCC): ICD-10-CM

## 2024-10-15 DIAGNOSIS — Z23 NEEDS FLU SHOT: ICD-10-CM

## 2024-10-15 DIAGNOSIS — I48.0 PAROXYSMAL ATRIAL FIBRILLATION (HCC): ICD-10-CM

## 2024-10-15 DIAGNOSIS — I87.2 VENOUS STASIS DERMATITIS: ICD-10-CM

## 2024-10-15 DIAGNOSIS — F01.B2 MODERATE VASCULAR DEMENTIA WITH PSYCHOTIC DISTURBANCE (HCC): ICD-10-CM

## 2024-10-15 LAB — HBA1C MFR BLD: 7.8 %

## 2024-10-15 PROCEDURE — G0008 ADMIN INFLUENZA VIRUS VAC: HCPCS

## 2024-10-15 PROCEDURE — 90653 IIV ADJUVANT VACCINE IM: CPT

## 2024-10-15 PROCEDURE — G0439 PPPS, SUBSEQ VISIT: HCPCS

## 2024-10-15 PROCEDURE — 3044F HG A1C LEVEL LT 7.0%: CPT

## 2024-10-15 PROCEDURE — 83036 HEMOGLOBIN GLYCOSYLATED A1C: CPT

## 2024-10-15 PROCEDURE — 36415 COLL VENOUS BLD VENIPUNCTURE: CPT

## 2024-10-15 PROCEDURE — 1123F ACP DISCUSS/DSCN MKR DOCD: CPT

## 2024-10-15 RX ORDER — VALACYCLOVIR HYDROCHLORIDE 1 G/1
1000 TABLET, FILM COATED ORAL 2 TIMES DAILY
COMMUNITY

## 2024-10-15 ASSESSMENT — PATIENT HEALTH QUESTIONNAIRE - PHQ9
SUM OF ALL RESPONSES TO PHQ QUESTIONS 1-9: 0
SUM OF ALL RESPONSES TO PHQ9 QUESTIONS 1 & 2: 0
2. FEELING DOWN, DEPRESSED OR HOPELESS: NOT AT ALL
1. LITTLE INTEREST OR PLEASURE IN DOING THINGS: NOT AT ALL
SUM OF ALL RESPONSES TO PHQ QUESTIONS 1-9: 0

## 2024-10-15 NOTE — ASSESSMENT & PLAN NOTE
Rate controlled on metoprolol; no OAC (HAS-BLED score 4: age, renal dz, stroke hx, bleeding hx).  Orders:    COLLECTION VENOUS BLOOD,VENIPUNCTURE    Basic Metabolic Panel; Future    Basic Metabolic Panel

## 2024-10-15 NOTE — PATIENT INSTRUCTIONS
as independent as possible.  How will a doctor assess your ADLs?  Asking about ADLs is part of a routine health checkup your doctor will likely do as you age. Your health check might be done in a doctor's office, in your home, or at a hospital. The goal is to find out if you are having any problems that could make it hard to care for yourself or that make it unsafe for you to be on your own.  To measure your ADLs, your doctor will ask how hard it is for you to do routine tasks. Your doctor may also want to know if you have changed the way you do a task because of a health problem. Your doctor may watch how you:  Walk back and forth.  Keep your balance while you stand or walk.  Move from sitting to standing or from a bed to a chair.  Button or unbutton a shirt or sweater.  Remove and put on your shoes.  It's common to feel a little worried or anxious if you find you can't do all the things you used to be able to do. Talking with your doctor about ADLs is a way to make sure you're as safe as possible and able to care for yourself as well as you can. You may want to bring a caregiver, friend, or family member to your checkup. They can help you talk to your doctor.  Follow-up care is a key part of your treatment and safety. Be sure to make and go to all appointments, and call your doctor if you are having problems. It's also a good idea to know your test results and keep a list of the medicines you take.  Current as of: October 24, 2023  Content Version: 14.2  © 2024 [employer].   Care instructions adapted under license by [employer]. If you have questions about a medical condition or this instruction, always ask your healthcare professional. [employer] disclaims any warranty or liability for your use of this information.           A Healthy Heart: Care Instructions  Overview     Coronary artery disease, also called heart disease, occurs when a substance called plaque builds up in the vessels

## 2024-10-15 NOTE — ASSESSMENT & PLAN NOTE
Recent Results (from the past 24 hour(s))   AMB POC HEMOGLOBIN A1C    Collection Time: 10/15/24  9:30 AM   Result Value Ref Range    Hemoglobin A1C, POC 7.8 %    Increasing some since stopping insulin, but still acceptable control given age and comorbidities.  Continue to monitor and consider escalating treatment if uncontrolled.  Orders:    COLLECTION VENOUS BLOOD,VENIPUNCTURE    AMB POC HEMOGLOBIN A1C    Basic Metabolic Panel; Future    Basic Metabolic Panel

## 2024-10-16 ENCOUNTER — TELEPHONE (OUTPATIENT)
Age: 89
End: 2024-10-16

## 2024-10-16 DIAGNOSIS — I10 ESSENTIAL (PRIMARY) HYPERTENSION: Primary | ICD-10-CM

## 2024-10-16 DIAGNOSIS — I48.0 PAROXYSMAL ATRIAL FIBRILLATION (HCC): ICD-10-CM

## 2024-10-16 LAB
ANION GAP SERPL CALC-SCNC: 6 MMOL/L (ref 2–12)
BUN SERPL-MCNC: 38 MG/DL (ref 6–20)
BUN/CREAT SERPL: 19 (ref 12–20)
CALCIUM SERPL-MCNC: 8.8 MG/DL (ref 8.5–10.1)
CHLORIDE SERPL-SCNC: 112 MMOL/L (ref 97–108)
CO2 SERPL-SCNC: 25 MMOL/L (ref 21–32)
CREAT SERPL-MCNC: 1.96 MG/DL (ref 0.55–1.02)
GLUCOSE SERPL-MCNC: 146 MG/DL (ref 65–100)
POTASSIUM SERPL-SCNC: 4.5 MMOL/L (ref 3.5–5.1)
SODIUM SERPL-SCNC: 143 MMOL/L (ref 136–145)

## 2024-10-16 RX ORDER — DILTIAZEM HYDROCHLORIDE 120 MG/1
120 CAPSULE, COATED, EXTENDED RELEASE ORAL DAILY
Qty: 90 CAPSULE | Refills: 1 | Status: SHIPPED | OUTPATIENT
Start: 2024-10-16

## 2024-10-16 NOTE — TELEPHONE ENCOUNTER
Pharmacy told Bettie Tiadytl  mg was discontinued for Elias. Bettie wants to know if she is she supposed to be taking or not. If so she will need a refill. She would like a call back to let her know.

## 2024-10-16 NOTE — TELEPHONE ENCOUNTER
PC FERNIE Walker Kaleb, informed of Gracy message advice. Pharmacy had stated medication had been cancelled, explained not sure of what happened, but was sent in today.  OK of understanding and thanks.

## 2024-11-15 ENCOUNTER — TELEPHONE (OUTPATIENT)
Age: 89
End: 2024-11-15

## 2024-11-15 NOTE — TELEPHONE ENCOUNTER
Patient's daughter Bettie is aware of NP [place] instructions and agrees. Will take patient to ED if SOB, chest pain and/or wheezing occurs.     Also states patient was 130lbs at 10/15/24 visit. This morning weight was 150lbs.

## 2024-11-19 ENCOUNTER — OFFICE VISIT (OUTPATIENT)
Age: 89
End: 2024-11-19
Payer: MEDICARE

## 2024-11-19 VITALS
RESPIRATION RATE: 18 BRPM | OXYGEN SATURATION: 99 % | WEIGHT: 170 LBS | TEMPERATURE: 97.7 F | BODY MASS INDEX: 32.12 KG/M2 | DIASTOLIC BLOOD PRESSURE: 72 MMHG | SYSTOLIC BLOOD PRESSURE: 118 MMHG | HEART RATE: 82 BPM

## 2024-11-19 DIAGNOSIS — I50.812: Primary | ICD-10-CM

## 2024-11-19 DIAGNOSIS — N18.4: Primary | ICD-10-CM

## 2024-11-19 DIAGNOSIS — E11.22 TYPE 2 DIABETES MELLITUS WITH STAGE 4 CHRONIC KIDNEY DISEASE, WITH LONG-TERM CURRENT USE OF INSULIN (HCC): ICD-10-CM

## 2024-11-19 DIAGNOSIS — N18.4 TYPE 2 DIABETES MELLITUS WITH STAGE 4 CHRONIC KIDNEY DISEASE, WITH LONG-TERM CURRENT USE OF INSULIN (HCC): ICD-10-CM

## 2024-11-19 DIAGNOSIS — Z79.4 TYPE 2 DIABETES MELLITUS WITH STAGE 4 CHRONIC KIDNEY DISEASE, WITH LONG-TERM CURRENT USE OF INSULIN (HCC): ICD-10-CM

## 2024-11-19 DIAGNOSIS — Z71.89 ACP (ADVANCE CARE PLANNING): ICD-10-CM

## 2024-11-19 DIAGNOSIS — I13.0: Primary | ICD-10-CM

## 2024-11-19 PROCEDURE — 36415 COLL VENOUS BLD VENIPUNCTURE: CPT

## 2024-11-19 PROCEDURE — 1159F MED LIST DOCD IN RCRD: CPT

## 2024-11-19 PROCEDURE — 1123F ACP DISCUSS/DSCN MKR DOCD: CPT

## 2024-11-19 PROCEDURE — 99215 OFFICE O/P EST HI 40 MIN: CPT

## 2024-11-19 PROCEDURE — 3044F HG A1C LEVEL LT 7.0%: CPT

## 2024-11-19 RX ORDER — BUMETANIDE 1 MG/1
1 TABLET ORAL 2 TIMES DAILY
Qty: 180 TABLET | Refills: 0 | Status: SHIPPED | OUTPATIENT
Start: 2024-11-19

## 2024-11-19 RX ORDER — REPAGLINIDE 0.5 MG/1
TABLET ORAL
Qty: 90 TABLET | Refills: 0 | OUTPATIENT
Start: 2024-11-19

## 2024-11-19 ASSESSMENT — ENCOUNTER SYMPTOMS
EYES NEGATIVE: 1
BLOOD IN STOOL: 0
COUGH: 0
CONSTIPATION: 0
DIARRHEA: 0
WHEEZING: 0
SHORTNESS OF BREATH: 0
RESPIRATORY NEGATIVE: 1
ALLERGIC/IMMUNOLOGIC NEGATIVE: 1

## 2024-11-19 NOTE — PROGRESS NOTES
\"Have you been to the ER, urgent care clinic since your last visit?  Hospitalized since your last visit?\"    NO    “Have you seen or consulted any other health care providers outside our system since your last visit?”    NO      Chief Complaint   Patient presents with    Edema    Dementia     Worsening     Medication Refill     prandin       Vitals:    11/19/24 1119   BP: 118/72   Pulse: 82   Resp: 18   Temp: 97.7 °F (36.5 °C)   SpO2: 99%          Labs drawn from right arm per Gracy Sr NP's orders. Patient tolerated well.  
Base) MCG/ACT inhaler Inhale 2 puffs into the lungs every 4 hours as needed for Shortness of Breath 18 g 1    cetirizine (ZYRTEC) 10 MG tablet TAKE 1 TABLET BY MOUTH DAILY 90 tablet 3    aspirin 81 MG EC tablet Take 1 tablet by mouth daily      acetaminophen (AMINOFEN) 325 MG tablet Take 2 tablets by mouth 2 times daily (with meals) 60 tablet 0     No current facility-administered medications for this visit.         Past Medical History:   Diagnosis Date    Abnormal echocardiogram 2016    New A-fib    Asthma     Bilateral lower leg cellulitis 2017    Bleeding hemorrhoids 2017    Candida rash of groin 2021    CKD (chronic kidney disease) stage 3, GFR 30-59 ml/min ([facility]) 2017    COPD (chronic obstructive pulmonary disease) ([facility])     HTN (hypertension) 2015    Pulmonary hypertension ([facility])     Renal mass 2019    Type 2 diabetes mellitus ([facility]) 2019    Venous stasis dermatitis 2017       No family history on file.    Review of Systems   Constitutional:  Negative for chills, fatigue and fever.   HENT: Negative.     Eyes: Negative.    Respiratory: Negative.  Negative for cough, shortness of breath and wheezing.    Cardiovascular:  Positive for leg swelling. Negative for chest pain and palpitations.   Gastrointestinal:  Negative for blood in stool, constipation and diarrhea.   Endocrine: Negative.    Genitourinary: Negative.    Musculoskeletal: Negative.    Skin: Negative.    Allergic/Immunologic: Negative.    Neurological: Negative.  Negative for dizziness and headaches.   Hematological: Negative.    Psychiatric/Behavioral:  Positive for confusion and hallucinations. Negative for dysphoric mood and sleep disturbance. The patient is not nervous/anxious.           Vitals:    11/19/24 1119   BP: 118/72   Pulse: 82   Resp: 18   Temp: 97.7 °F (36.5 °C)   SpO2: 99%   Weight: 77.1 kg (170 lb)        Wt Readings from Last 3 Encounters:   11/19/24 77.1 kg (170 lb)   10/15/24 69.4 kg (153 lb)   05/24/24 86.2 kg (190 lb)

## 2024-11-19 NOTE — PATIENT INSTRUCTIONS
Please stop taking furosemide (LASIX); you will replace this with bumetanide (BUMEX).      Please stop repaglinide (PRANDIN); you will replace this with empagliflozin (JARDIANCE).    You should limit your fluid intake to 2 liters daily; you should limit your sodium intake to less than 3 grams daily.    Please weigh yourself daily; contact the office if you have a weight gain of more than 3 pounds in a day, or 5 pounds in a week.

## 2024-11-20 ENCOUNTER — TELEPHONE (OUTPATIENT)
Age: 89
End: 2024-11-20

## 2024-11-20 DIAGNOSIS — I50.812: Primary | ICD-10-CM

## 2024-11-20 DIAGNOSIS — I50.813 ACUTE ON CHRONIC RIGHT-SIDED CONGESTIVE HEART FAILURE (HCC): ICD-10-CM

## 2024-11-20 DIAGNOSIS — F01.B2 MODERATE VASCULAR DEMENTIA WITH PSYCHOTIC DISTURBANCE (HCC): ICD-10-CM

## 2024-11-20 DIAGNOSIS — I13.0: Primary | ICD-10-CM

## 2024-11-20 DIAGNOSIS — K62.3 PARTIAL RECTAL PROLAPSE: ICD-10-CM

## 2024-11-20 DIAGNOSIS — N18.4 STAGE 4 CHRONIC KIDNEY DISEASE (HCC): ICD-10-CM

## 2024-11-20 DIAGNOSIS — I87.2 VENOUS STASIS DERMATITIS: ICD-10-CM

## 2024-11-20 DIAGNOSIS — N18.4: Primary | ICD-10-CM

## 2024-11-20 LAB
ANION GAP SERPL CALC-SCNC: 8 MMOL/L (ref 2–12)
BUN SERPL-MCNC: 37 MG/DL (ref 6–20)
BUN/CREAT SERPL: 20 (ref 12–20)
CALCIUM SERPL-MCNC: 9.3 MG/DL (ref 8.5–10.1)
CHLORIDE SERPL-SCNC: 108 MMOL/L (ref 97–108)
CO2 SERPL-SCNC: 26 MMOL/L (ref 21–32)
CREAT SERPL-MCNC: 1.86 MG/DL (ref 0.55–1.02)
GLUCOSE SERPL-MCNC: 160 MG/DL (ref 65–100)
POTASSIUM SERPL-SCNC: 4.1 MMOL/L (ref 3.5–5.1)
SODIUM SERPL-SCNC: 142 MMOL/L (ref 136–145)

## 2024-11-20 NOTE — TELEPHONE ENCOUNTER
FERNIE Hines, informed of Gracy's message, OK of understanding and thanks.  She would like to inform Gracy of, they do have a  on board now as well as PT, OP therapy and speech along with their regular skilled nursing.

## 2024-11-20 NOTE — TELEPHONE ENCOUNTER
Donny said they don't provide HH aide services and wanted to make sure it was ok with Gracy. Would like a call back.

## 2024-12-04 ENCOUNTER — TELEPHONE (OUTPATIENT)
Age: 88
End: 2024-12-04

## 2024-12-04 NOTE — TELEPHONE ENCOUNTER
Patient's last visit of home health was yesterday (12/3/24.) She has not been admitted to hospice yet due to hospice not having any cardiology records for the patient. Hospice is working on admitting patient under kidney cancer diagnosis.

## 2024-12-04 NOTE — TELEPHONE ENCOUNTER
Bettie states both legs have some swelling. This am the top part of her L leg is really big, the R leg is very painful. Not able to get up without help.Bettie has noticed wheezing only when Elias exhales. Elias said she had a little tightness in her chest this morning but it has gone away. Bettie wants to now if there is anything she can do for Elias or would she need to be seen.

## 2024-12-04 NOTE — TELEPHONE ENCOUNTER
Spoke with Bettie. States patient was hard to arouse from sleep earlier but seems more alert now. Questions if patient may have blood clot in leg. Advised Bettie per [facility] if she is concerned patient should be evaluated in the ED. Bettie agreed stating she may call 911 tomorrow if leg pain continues or if patient is not alert and oriented.    Patient

## 2024-12-06 ENCOUNTER — TELEPHONE (OUTPATIENT)
Age: 88
End: 2024-12-06

## 2024-12-06 NOTE — TELEPHONE ENCOUNTER
Nicky from Hospice stopped by asking for nurse to contact Lyssa Braga at the office to discuss getting pt admitted into services.

## 2024-12-06 NOTE — TELEPHONE ENCOUNTER
Nicky rt the call, per our conversation, April will return the call, not available at the moment.  They are needing more information from Gracy to qualify Ms Bhandari for hospice.

## 2024-12-12 ENCOUNTER — TELEPHONE (OUTPATIENT)
Age: 88
End: 2024-12-12

## 2024-12-12 NOTE — TELEPHONE ENCOUNTER
Faxed patient's last OV note and complete echo report to [facility]. Spoke with Nadia who will relay info to Lyssa Braga. They will let us know if any additional information is needed. Confirmation received.

## 2024-12-17 ENCOUNTER — OFFICE VISIT (OUTPATIENT)
Age: 88
End: 2024-12-17
Payer: MEDICARE

## 2024-12-17 VITALS
OXYGEN SATURATION: 100 % | RESPIRATION RATE: 18 BRPM | SYSTOLIC BLOOD PRESSURE: 118 MMHG | HEART RATE: 63 BPM | WEIGHT: 161 LBS | BODY MASS INDEX: 30.42 KG/M2 | DIASTOLIC BLOOD PRESSURE: 70 MMHG | TEMPERATURE: 97.8 F

## 2024-12-17 DIAGNOSIS — I87.2 VENOUS STASIS DERMATITIS: ICD-10-CM

## 2024-12-17 DIAGNOSIS — F01.B2 MODERATE VASCULAR DEMENTIA WITH PSYCHOTIC DISTURBANCE (HCC): Primary | ICD-10-CM

## 2024-12-17 DIAGNOSIS — N28.89 MASS OF KIDNEY WITH IMPAIRED RENAL FUNCTION: ICD-10-CM

## 2024-12-17 DIAGNOSIS — I50.812: ICD-10-CM

## 2024-12-17 DIAGNOSIS — I48.0 PAROXYSMAL ATRIAL FIBRILLATION (HCC): ICD-10-CM

## 2024-12-17 DIAGNOSIS — N18.4: ICD-10-CM

## 2024-12-17 DIAGNOSIS — I13.0: ICD-10-CM

## 2024-12-17 DIAGNOSIS — N18.4 STAGE 4 CHRONIC KIDNEY DISEASE (HCC): ICD-10-CM

## 2024-12-17 DIAGNOSIS — F51.04 PSYCHOPHYSIOLOGICAL INSOMNIA: ICD-10-CM

## 2024-12-17 PROCEDURE — 99214 OFFICE O/P EST MOD 30 MIN: CPT

## 2024-12-17 PROCEDURE — 36415 COLL VENOUS BLD VENIPUNCTURE: CPT

## 2024-12-17 PROCEDURE — 1123F ACP DISCUSS/DSCN MKR DOCD: CPT

## 2024-12-17 PROCEDURE — 1159F MED LIST DOCD IN RCRD: CPT

## 2024-12-17 RX ORDER — MIRTAZAPINE 7.5 MG/1
7.5 TABLET, FILM COATED ORAL NIGHTLY
Qty: 90 TABLET | Refills: 0 | Status: SHIPPED | OUTPATIENT
Start: 2024-12-17

## 2024-12-17 NOTE — PROGRESS NOTES
Chief Complaint   Patient presents with    Edema       HPI:     is a 89 y.o. female who presents for follow-up; she is accompanied by her daughter.      Daughter notes worsening leg and abdominal swelling for the past month or more; furosemide was changed to Bumex and weight is down about 10 pounds in past month.  Echo 05/2024 showed LVEF 60-65%, mild MVR, severe TVR, moderate RVD c/w pulmonary HTN.    HTN:  Compliant with metoprolol and diltiazem; does not check BP at home. Denies CP, palpitations, SOB, LENTZ.  Venous stasis dermatitis bilaterally is unchanged; does not wear compression hose, not keeping legs up.     PAF:  Remains compliant with diltiazem and metoprolol; does not follow with cardiology.  Previously on warfarin but this was stopped to due to rectal bleeding and hx multiple falls.     HLD:  Compliant with simvastatin.       DM:  Insulin stopped about 3 months ago for suspected hypoglycemia following admission for TIA with multiple falls.  A1C 6.5-->6.8%.     CKD: Creatinine declining 1.29-->1.96, with albuminuria.  Left renal mass was found on CT in 12/2023 following episode of painless hematuria; she has elected to defer further workup for this given her advanced age.     IBS:  Ongoing issue for many years, mostly diarrhea-type with occasional constipation.  Has improved some since moving in with her daughter, but continues to have episodes of full fecal urge incontinence; daughter avoids triggering foods in her diet including starches, certain fruits and vegetables. Has some degree of rectal prolapse or prolapsed hemorrhoids; currently using a numbing cream on these which helps some.  Has occasional bright blood on the toilet tissue, but no hematochezia.     Dementia:  Retains good remote memory, short-term memory impacted; believes characters on television are interacting with her and believes the neighbor is conducting musical concerts in the backyard every night, none of which is

## 2024-12-17 NOTE — PROGRESS NOTES
\"Have you been to the ER, urgent care clinic since your last visit?  Hospitalized since your last visit?\"    NO    “Have you seen or consulted any other health care providers outside our system since your last visit?”    NO      Chief Complaint   Patient presents with    Edema    Rectal Bleeding     Prolapsed bleeding this morning       Vitals:    12/17/24 1114   BP: 118/70   Pulse: 63   Resp: 18   Temp: 97.8 °F (36.6 °C)   SpO2: 100%

## 2024-12-18 ENCOUNTER — TELEPHONE (OUTPATIENT)
Age: 88
End: 2024-12-18

## 2024-12-18 LAB
ANION GAP SERPL CALC-SCNC: 12 MMOL/L (ref 2–12)
BUN SERPL-MCNC: 41 MG/DL (ref 6–20)
BUN/CREAT SERPL: 20 (ref 12–20)
CALCIUM SERPL-MCNC: 9.5 MG/DL (ref 8.5–10.1)
CHLORIDE SERPL-SCNC: 106 MMOL/L (ref 97–108)
CO2 SERPL-SCNC: 23 MMOL/L (ref 21–32)
CREAT SERPL-MCNC: 2.04 MG/DL (ref 0.55–1.02)
GLUCOSE SERPL-MCNC: 151 MG/DL (ref 65–100)
POTASSIUM SERPL-SCNC: 4.2 MMOL/L (ref 3.5–5.1)
SODIUM SERPL-SCNC: 141 MMOL/L (ref 136–145)

## 2024-12-23 ENCOUNTER — HOSPITAL ENCOUNTER (EMERGENCY)
Facility: HOSPITAL | Age: 88
Discharge: HOME | End: 2024-12-23
Attending: FAMILY MEDICINE | Admitting: FAMILY MEDICINE
Payer: MEDICARE

## 2024-12-23 VITALS
RESPIRATION RATE: 16 BRPM | DIASTOLIC BLOOD PRESSURE: 94 MMHG | WEIGHT: 142 LBS | SYSTOLIC BLOOD PRESSURE: 143 MMHG | HEART RATE: 83 BPM | TEMPERATURE: 97.6 F | BODY MASS INDEX: 26.81 KG/M2 | HEIGHT: 61 IN | OXYGEN SATURATION: 98 %

## 2024-12-23 DIAGNOSIS — G89.29 OTHER CHRONIC PAIN: Primary | ICD-10-CM

## 2024-12-23 DIAGNOSIS — M79.605 LEG PAIN, BILATERAL: ICD-10-CM

## 2024-12-23 DIAGNOSIS — M79.604 LEG PAIN, BILATERAL: ICD-10-CM

## 2024-12-23 PROCEDURE — 6370000000 HC RX 637 (ALT 250 FOR IP): Performed by: FAMILY MEDICINE

## 2024-12-23 PROCEDURE — 99283 EMERGENCY DEPT VISIT LOW MDM: CPT

## 2024-12-23 RX ORDER — HYDROCODONE BITARTRATE AND ACETAMINOPHEN 5; 325 MG/1; MG/1
1 TABLET ORAL EVERY 6 HOURS PRN
Qty: 12 TABLET | Refills: 0 | Status: SHIPPED | OUTPATIENT
Start: 2024-12-23 | End: 2024-12-26

## 2024-12-23 RX ORDER — HYDROCODONE BITARTRATE AND ACETAMINOPHEN 5; 325 MG/1; MG/1
1 TABLET ORAL
Status: COMPLETED | OUTPATIENT
Start: 2024-12-23 | End: 2024-12-23

## 2024-12-23 RX ORDER — ACETAMINOPHEN 500 MG
1000 TABLET ORAL
Status: COMPLETED | OUTPATIENT
Start: 2024-12-23 | End: 2024-12-23

## 2024-12-23 RX ADMIN — ACETAMINOPHEN 1000 MG: 500 TABLET ORAL at 21:35

## 2024-12-23 RX ADMIN — HYDROCODONE BITARTRATE AND ACETAMINOPHEN 1 TABLET: 5; 325 TABLET ORAL at 22:52

## 2024-12-23 ASSESSMENT — PAIN SCALES - GENERAL
PAINLEVEL_OUTOF10: 4
PAINLEVEL_OUTOF10: 4
PAINLEVEL_OUTOF10: 8
PAINLEVEL_OUTOF10: 5

## 2024-12-23 ASSESSMENT — PAIN DESCRIPTION - ORIENTATION: ORIENTATION: RIGHT;LEFT

## 2024-12-23 ASSESSMENT — PAIN DESCRIPTION - PAIN TYPE: TYPE: ACUTE PAIN

## 2024-12-23 ASSESSMENT — PAIN - FUNCTIONAL ASSESSMENT
PAIN_FUNCTIONAL_ASSESSMENT: 0-10
PAIN_FUNCTIONAL_ASSESSMENT: 0-10

## 2024-12-23 ASSESSMENT — PAIN DESCRIPTION - LOCATION: LOCATION: LEG

## 2024-12-24 NOTE — ED PROVIDER NOTES
[facility] EMERGENCY DEP  EMERGENCY DEPARTMENT ENCOUNTER       Pt Name: Elias Bhandari  MRN: [id number]  Birthdate 6/3/1929  Date of evaluation: 12/23/2024  Provider: Cecilia Butterfield MD   PCP: Gracy Sr APRN - NP  Note Started: 11:12 PM EST 12/23/24     CHIEF COMPLAINT       Chief Complaint   Patient presents with    Leg Pain        HISTORY OF PRESENT ILLNESS: 1 or more elements      History From: Patient and Patient's Daughter  HPI Limitations: None     Elias Bhandari is a 89 y.o. female who was brought to the ED by EMS because of leg pain.     Nursing Notes were all reviewed and agreed with or any disagreements were addressed in the HPI.     REVIEW OF SYSTEMS      Review of Systems     Positives and Pertinent negatives as per HPI.    PAST HISTORY     Past Medical History:  Past Medical History:   Diagnosis Date    Abnormal echocardiogram 2016    New A-fib    Asthma     Bilateral lower leg cellulitis 2017    Bleeding hemorrhoids 2017    Candida rash of groin 2021    CKD (chronic kidney disease) stage 3, GFR 30-59 ml/min ([facility]) 2017    COPD (chronic obstructive pulmonary disease) ([facility])     HTN (hypertension) 2015    Pulmonary hypertension (HCC)     Renal mass 2019    Type 2 diabetes mellitus (HCC) 2019    Venous stasis dermatitis 2017         Past Surgical History:  No past surgical history on file.    Family History:  No family history on file.    Social History:  Social History     Tobacco Use    Smoking status: Never    Smokeless tobacco: Never   Substance Use Topics    Alcohol use: Never    Drug use: Never       Allergies:  Allergies   Allergen Reactions    Lactose Intolerance (Gi) Diarrhea    Acetaminophen Other (See Comments)     Patient tolerates acetaminophen  Unknown reason why listed    Adhesive Tape     Penicillins Hives    Sulfa Antibiotics Hives       CURRENT MEDICATIONS      Discharge Medication List as of 12/23/2024  9:58 PM        CONTINUE these medications which have NOT CHANGED    Details   mirtazapine

## 2024-12-24 NOTE — ED TRIAGE NOTES
Patient arrived with concerns of bilateral chronic leg pain. Patient did not attend her appointment today to have her legs wrapped. Patient states she needs something for pain.

## 2025-01-14 SDOH — ECONOMIC STABILITY: INCOME INSECURITY: IN THE LAST 12 MONTHS, WAS THERE A TIME WHEN YOU WERE NOT ABLE TO PAY THE MORTGAGE OR RENT ON TIME?: NO

## 2025-01-14 SDOH — ECONOMIC STABILITY: FOOD INSECURITY: WITHIN THE PAST 12 MONTHS, THE FOOD YOU BOUGHT JUST DIDN'T LAST AND YOU DIDN'T HAVE MONEY TO GET MORE.: NEVER TRUE

## 2025-01-14 SDOH — ECONOMIC STABILITY: FOOD INSECURITY: WITHIN THE PAST 12 MONTHS, YOU WORRIED THAT YOUR FOOD WOULD RUN OUT BEFORE YOU GOT MONEY TO BUY MORE.: NEVER TRUE

## 2025-01-14 SDOH — ECONOMIC STABILITY: TRANSPORTATION INSECURITY
IN THE PAST 12 MONTHS, HAS THE LACK OF TRANSPORTATION KEPT YOU FROM MEDICAL APPOINTMENTS OR FROM GETTING MEDICATIONS?: NO

## 2025-01-14 SDOH — ECONOMIC STABILITY: TRANSPORTATION INSECURITY
IN THE PAST 12 MONTHS, HAS LACK OF TRANSPORTATION KEPT YOU FROM MEETINGS, WORK, OR FROM GETTING THINGS NEEDED FOR DAILY LIVING?: NO

## 2025-01-15 ENCOUNTER — OFFICE VISIT (OUTPATIENT)
Age: 89
End: 2025-01-15

## 2025-01-15 VITALS
DIASTOLIC BLOOD PRESSURE: 68 MMHG | TEMPERATURE: 98 F | HEART RATE: 61 BPM | OXYGEN SATURATION: 95 % | WEIGHT: 134 LBS | BODY MASS INDEX: 25.32 KG/M2 | SYSTOLIC BLOOD PRESSURE: 118 MMHG

## 2025-01-15 DIAGNOSIS — N18.4 STAGE 4 CHRONIC KIDNEY DISEASE (HCC): ICD-10-CM

## 2025-01-15 DIAGNOSIS — I10 ESSENTIAL (PRIMARY) HYPERTENSION: ICD-10-CM

## 2025-01-15 DIAGNOSIS — I50.812: ICD-10-CM

## 2025-01-15 DIAGNOSIS — F01.B2 MODERATE VASCULAR DEMENTIA WITH PSYCHOTIC DISTURBANCE (HCC): ICD-10-CM

## 2025-01-15 DIAGNOSIS — N18.4: ICD-10-CM

## 2025-01-15 DIAGNOSIS — I13.0: ICD-10-CM

## 2025-01-15 DIAGNOSIS — E11.22 TYPE 2 DIABETES MELLITUS WITH STAGE 4 CHRONIC KIDNEY DISEASE, WITH LONG-TERM CURRENT USE OF INSULIN (HCC): ICD-10-CM

## 2025-01-15 DIAGNOSIS — R13.10 DYSPHAGIA, UNSPECIFIED TYPE: ICD-10-CM

## 2025-01-15 DIAGNOSIS — Z79.4 TYPE 2 DIABETES MELLITUS WITH STAGE 4 CHRONIC KIDNEY DISEASE, WITH LONG-TERM CURRENT USE OF INSULIN (HCC): ICD-10-CM

## 2025-01-15 DIAGNOSIS — N18.4 TYPE 2 DIABETES MELLITUS WITH STAGE 4 CHRONIC KIDNEY DISEASE, WITH LONG-TERM CURRENT USE OF INSULIN (HCC): ICD-10-CM

## 2025-01-15 DIAGNOSIS — R47.1 DYSARTHRIA: ICD-10-CM

## 2025-01-15 DIAGNOSIS — I87.2 VENOUS STASIS DERMATITIS: ICD-10-CM

## 2025-01-15 DIAGNOSIS — N28.89 MASS OF KIDNEY WITH IMPAIRED RENAL FUNCTION: ICD-10-CM

## 2025-01-15 DIAGNOSIS — I48.0 PAROXYSMAL ATRIAL FIBRILLATION (HCC): ICD-10-CM

## 2025-01-15 DIAGNOSIS — Z09 HOSPITAL DISCHARGE FOLLOW-UP: Primary | ICD-10-CM

## 2025-01-15 ASSESSMENT — ENCOUNTER SYMPTOMS
EYES NEGATIVE: 1
ALLERGIC/IMMUNOLOGIC NEGATIVE: 1
CONSTIPATION: 0
BLOOD IN STOOL: 0
SHORTNESS OF BREATH: 0
DIARRHEA: 0
COUGH: 0
WHEEZING: 0
RESPIRATORY NEGATIVE: 1

## 2025-01-15 ASSESSMENT — PATIENT HEALTH QUESTIONNAIRE - PHQ9
SUM OF ALL RESPONSES TO PHQ9 QUESTIONS 1 & 2: 0
SUM OF ALL RESPONSES TO PHQ QUESTIONS 1-9: 0
2. FEELING DOWN, DEPRESSED OR HOPELESS: NOT AT ALL
SUM OF ALL RESPONSES TO PHQ QUESTIONS 1-9: 0
1. LITTLE INTEREST OR PLEASURE IN DOING THINGS: NOT AT ALL

## 2025-01-15 NOTE — PROGRESS NOTES
Chief Complaint   Patient presents with    Follow-Up from Hospital     Leg pain        HPI:     is a 89 y.o. female who presents for follow-up.      She was seen at [facility] on 12/23 for bilateral leg pain after presenting to the office on 12/17 for increase edema and redness for which bilateral unna boots were applied.  Her pain improved with APAP given in the ED and she was discharged home with recommended f/u here in the office.  Her daughter reports that she continues to complain of leg pain intermittently, but this occurs more often after she is up walking hours at a time.  Daughter reports that most days her mother has to be assisted with standing and mobility, but she has occasional episodes where she will get up without assistance and pace the home, sometimes for hours and even days.  During these episodes, she has increased confusion, delusions (reciprocal conversations with dead family members, conversations with pictures on the wall), and hallucinations.    Daughter notes that she has more difficulty with swallowing, has a hard time getting pills to go down; this has improved since she started using applesauce to administer medications.  No choking or coughing with medications or meals.  Elias also notes difficulty with her speech, trouble findings words, and slurred speech for the past few weeks; tells me, \"I think I've had another stroke\".  She also notes that her dentures no longer fit well; they occasionally fall out in the night.  Her appetite has decreased; weight is down almost 20 pounds from her dry weight of about 155.  Elias tells me that she enjoys food and \"cleans her plate\", but her daughter notes that she often says \"I just ate\" when offered meals.    Allergies   Allergen Reactions    Lactose Intolerance (Gi) Diarrhea    Adhesive Tape     Penicillins Hives    Sulfa Antibiotics Hives       Current Outpatient Medications   Medication Sig Dispense Refill    mirtazapine (REMERON) 7.5 MG

## 2025-02-25 DIAGNOSIS — N18.4: ICD-10-CM

## 2025-02-25 DIAGNOSIS — I13.0: ICD-10-CM

## 2025-02-25 DIAGNOSIS — I50.812: ICD-10-CM

## 2025-02-25 RX ORDER — BUMETANIDE 1 MG/1
1 TABLET ORAL 2 TIMES DAILY
Qty: 180 TABLET | Refills: 0 | Status: SHIPPED | OUTPATIENT
Start: 2025-02-25

## 2025-03-18 DIAGNOSIS — I50.812: ICD-10-CM

## 2025-03-18 DIAGNOSIS — I13.0: ICD-10-CM

## 2025-03-18 DIAGNOSIS — E11.22 TYPE 2 DIABETES MELLITUS WITH STAGE 4 CHRONIC KIDNEY DISEASE, WITH LONG-TERM CURRENT USE OF INSULIN (HCC): ICD-10-CM

## 2025-03-18 DIAGNOSIS — Z79.4 TYPE 2 DIABETES MELLITUS WITH STAGE 4 CHRONIC KIDNEY DISEASE, WITH LONG-TERM CURRENT USE OF INSULIN (HCC): ICD-10-CM

## 2025-03-18 DIAGNOSIS — N18.4 TYPE 2 DIABETES MELLITUS WITH STAGE 4 CHRONIC KIDNEY DISEASE, WITH LONG-TERM CURRENT USE OF INSULIN (HCC): ICD-10-CM

## 2025-03-18 DIAGNOSIS — N18.4: ICD-10-CM

## 2025-03-18 RX ORDER — EMPAGLIFLOZIN 10 MG/1
10 TABLET, FILM COATED ORAL DAILY
Qty: 90 TABLET | Refills: 0 | Status: SHIPPED | OUTPATIENT
Start: 2025-03-18

## 2025-03-27 ENCOUNTER — TELEPHONE (OUTPATIENT)
Age: 89
End: 2025-03-27

## 2025-03-27 NOTE — TELEPHONE ENCOUNTER
S/w Naz[facility] patient can not afford her Jardiance  Blood sugars average 270 and wants to switch to metformin. Please advise

## 2025-04-16 DIAGNOSIS — I48.0 PAROXYSMAL ATRIAL FIBRILLATION (HCC): ICD-10-CM

## 2025-04-16 DIAGNOSIS — I10 ESSENTIAL (PRIMARY) HYPERTENSION: ICD-10-CM

## 2025-04-16 RX ORDER — DILTIAZEM HYDROCHLORIDE 120 MG/1
120 CAPSULE, COATED, EXTENDED RELEASE ORAL DAILY
Qty: 90 CAPSULE | Refills: 1 | Status: SHIPPED | OUTPATIENT
Start: 2025-04-16

## 2025-05-12 ENCOUNTER — TELEPHONE (OUTPATIENT)
Age: 89
End: 2025-05-12

## 2025-05-12 NOTE — TELEPHONE ENCOUNTER
Spoke to patients daughter. LOIDA to [place] to stop checking glucose daily. OK to stop jardiance once current script runs out. Call office for concerns.

## 2025-05-12 NOTE — TELEPHONE ENCOUNTER
Hospice nurse states sugars are running 270-470 on Jardiance. Wants to know if something should be added. Has also called and spoken Dr Doe but has not received any new orders.Wanted to have PCPs input on if anything should/could be added

## 2025-05-28 ENCOUNTER — TELEPHONE (OUTPATIENT)
Age: 89
End: 2025-05-28